# Patient Record
Sex: FEMALE | Race: WHITE | NOT HISPANIC OR LATINO | Employment: UNEMPLOYED | URBAN - NONMETROPOLITAN AREA
[De-identification: names, ages, dates, MRNs, and addresses within clinical notes are randomized per-mention and may not be internally consistent; named-entity substitution may affect disease eponyms.]

---

## 2017-01-19 ENCOUNTER — CONSULT (OUTPATIENT)
Dept: CARDIOLOGY | Facility: CLINIC | Age: 77
End: 2017-01-19

## 2017-01-19 VITALS
HEART RATE: 62 BPM | BODY MASS INDEX: 27.48 KG/M2 | WEIGHT: 140 LBS | HEIGHT: 60 IN | SYSTOLIC BLOOD PRESSURE: 112 MMHG | DIASTOLIC BLOOD PRESSURE: 62 MMHG

## 2017-01-19 DIAGNOSIS — E78.00 HYPERCHOLESTEREMIA: ICD-10-CM

## 2017-01-19 DIAGNOSIS — R07.89 CHEST TIGHTNESS: ICD-10-CM

## 2017-01-19 DIAGNOSIS — R01.1 MURMUR, CARDIAC: ICD-10-CM

## 2017-01-19 DIAGNOSIS — R06.02 SHORTNESS OF BREATH: ICD-10-CM

## 2017-01-19 DIAGNOSIS — I10 ESSENTIAL HYPERTENSION: ICD-10-CM

## 2017-01-19 DIAGNOSIS — I25.10 CORONARY ARTERY DISEASE INVOLVING NATIVE CORONARY ARTERY OF NATIVE HEART WITHOUT ANGINA PECTORIS: Primary | ICD-10-CM

## 2017-01-19 PROCEDURE — 99204 OFFICE O/P NEW MOD 45 MIN: CPT | Performed by: INTERNAL MEDICINE

## 2017-01-19 PROCEDURE — 93000 ELECTROCARDIOGRAM COMPLETE: CPT | Performed by: INTERNAL MEDICINE

## 2017-01-19 RX ORDER — DILTIAZEM HYDROCHLORIDE 180 MG/1
180 CAPSULE, COATED, EXTENDED RELEASE ORAL DAILY
COMMUNITY
End: 2017-12-20 | Stop reason: DRUGHIGH

## 2017-01-19 RX ORDER — PANTOPRAZOLE SODIUM 40 MG/1
40 TABLET, DELAYED RELEASE ORAL DAILY
COMMUNITY

## 2017-01-19 RX ORDER — DICYCLOMINE HCL 20 MG
20 TABLET ORAL NIGHTLY
Qty: 30 TABLET | Refills: 8 | Status: SHIPPED | OUTPATIENT
Start: 2017-01-19 | End: 2017-03-15 | Stop reason: ALTCHOICE

## 2017-01-19 RX ORDER — NITROGLYCERIN 0.4 MG/1
TABLET SUBLINGUAL
Qty: 100 TABLET | Refills: 11 | Status: SHIPPED | OUTPATIENT
Start: 2017-01-19 | End: 2017-12-20 | Stop reason: ALTCHOICE

## 2017-01-19 RX ORDER — EZETIMIBE 10 MG/1
10 TABLET ORAL DAILY
COMMUNITY
End: 2020-09-21 | Stop reason: SDUPTHER

## 2017-01-19 RX ORDER — CLOPIDOGREL BISULFATE 75 MG/1
75 TABLET ORAL DAILY
COMMUNITY
End: 2019-11-20 | Stop reason: ALTCHOICE

## 2017-01-19 RX ORDER — LISINOPRIL 5 MG/1
5 TABLET ORAL DAILY
COMMUNITY
End: 2017-05-17 | Stop reason: SDUPTHER

## 2017-01-19 NOTE — LETTER
January 19, 2017     Buffy Weldon MD  [address] [facility] [address]    Patient: Mago Gupta   YOB: 1940   Date of Visit: 1/19/2017       Dear Dr. Shiraz MD:    Thank you for referring Mago Gupta to me for evaluation. Below are the relevant portions of my assessment and plan of care.       Subjective   Mago Gupta is a 76 y.o. female came in today for her initial cardiac evaluation.  She has history of ischemic heart disease diagnosed in 2014.  She apparently was been having chest pain, and was referred to cardiologist from [place].  She underwent stress test followed by cardiac catheterization and stent placement.  The details of which is not available to me.  She did undergo a stress test after that and it was reported as normal.  She now wants to be followed locally since those cardiologists have stopped coming to [place].  She is under a lot of stress recently, taking care of her daughter who is mentally handicapped and now has a hip fracture.  She has been doing a lot of pulling and tugging and started noticing increasing chest pain.  She takes a baby aspirin at that time and feels little better.  She is not able to take aspirin everyday secondary to gastritis.  Her lab work done recently showed that the LDL is around 99.  She also has been having occasional shortness of breath, no history of palpitation or dizziness.              Cardiac History  Past Surgical History   Procedure Laterality Date   • Cardiovascular stress test  09/15/2014     @[facility]. CVA- 5 Min, 95% THR. Negative.   • Cardiac catheterization  08/18/2014     Stent in [place]       Current Outpatient Prescriptions   Medication Sig Dispense Refill   • Calcium Citrate-Vitamin D (CALCIUM + D PO) Take  by mouth.     • clopidogrel (PLAVIX) 75 MG tablet Take 75 mg by mouth Daily.     • diltiaZEM CD (CARDIZEM CD) 180 MG 24 hr capsule Take 180 mg by mouth Daily.     • ezetimibe (ZETIA) 10 MG tablet Take 10 mg by  mouth Daily.     • lisinopril (PRINIVIL,ZESTRIL) 5 MG tablet Take 5 mg by mouth Daily.     • Multiple Vitamins-Minerals (MULTI COMPLETE PO) Take  by mouth.     • Multiple Vitamins-Minerals (PRESERVISION AREDS PO) Take  by mouth 2 (Two) Times a Day.     • pantoprazole (PROTONIX) 40 MG EC tablet Take 40 mg by mouth Daily.     • dicyclomine (BENTYL) 20 MG tablet Take 1 tablet by mouth Every Night. 30 tablet 8   • nitroglycerin (NITROSTAT) 0.4 MG SL tablet 1 under the tongue as needed for angina, may repeat q5mins for up three doses 100 tablet 11     No current facility-administered medications for this visit.        Allergies:  Celebrex [celecoxib]; Codeine; and Penicillins              Assessment/Plan   Her heart rate and blood pressure appears to be stable.  Her EKG showed LVH, old septal infarct, possible lateral infarct with no acute ST-T changes.  Her clinical examination is unremarkable.  Some of her symptoms could be related to GERD.  She is already on a PPI.  I added Bentyl 20 mg twice a day.  Given her history, need to rule out cardiac cause of the chest pain.  I scheduled her to undergo a stress test to evaluate her functional status, blood pressure response and rule out ischemia.  She also need an echocardiogram to evaluate the LV function and any wall motion abnormalities.  If there is evidence of ischemia, she may need to undergo cardiac catheterization again.  At that time, she may need to be on a statin also and try to keep the LDL less than 70.  Based on the results of the test, further recommendations will be made.   Mago was seen today for establish care, labs, chest pain, stress and cardiac testing.    Diagnoses and all orders for this visit:    Coronary artery disease involving native coronary artery of native heart without angina pectoris  -     Stress Test With Myocardial Perfusion One Day; Future    Essential hypertension    Hypercholesteremia    Chest tightness  -     Stress Test With  Myocardial Perfusion One Day; Future    Shortness of breath  -     Adult Transthoracic Echo Complete; Future    Murmur, cardiac  -     Adult Transthoracic Echo Complete; Future    Other orders  -     dicyclomine (BENTYL) 20 MG tablet; Take 1 tablet by mouth Every Night.  -     nitroglycerin (NITROSTAT) 0.4 MG SL tablet; 1 under the tongue as needed for angina, may repeat q5mins for up three doses                   If you have questions, please do not hesitate to call me. I look forward to following Mago along with you.         Sincerely,        Kris Viera MD        CC: No Recipients

## 2017-01-19 NOTE — MR AVS SNAPSHOT
Mago Gupta   1/19/2017 10:00 AM   Consult    Dept Phone:  [phone]   Encounter #:  [id number]    Provider:  Kris Viera MD   Department:  [facility] CARDIOLOGY                Your Full Care Plan              Today's Medication Changes          These changes are accurate as of: 1/19/17 10:57 AM.  If you have any questions, ask your nurse or doctor.               New Medication(s)Ordered:     dicyclomine 20 MG tablet   Commonly known as:  BENTYL   Take 1 tablet by mouth Every Night.   Started by:  Kris Viera MD       nitroglycerin 0.4 MG SL tablet   Commonly known as:  NITROSTAT   1 under the tongue as needed for angina, may repeat q5mins for up three doses   Started by:  Kris Viera MD            Where to Get Your Medications      These medications were sent to [place] [address] AT [facility]  - [phone] [facility] [phone] [address] [address]     Phone:  [phone]     dicyclomine 20 MG tablet    nitroglycerin 0.4 MG SL tablet                  Your Updated Medication List          This list is accurate as of: 1/19/17 10:57 AM.  Always use your most recent med list.                CALCIUM + D PO       clopidogrel 75 MG tablet   Commonly known as:  PLAVIX       dicyclomine 20 MG tablet   Commonly known as:  BENTYL   Take 1 tablet by mouth Every Night.       diltiaZEM  MG 24 hr capsule   Commonly known as:  CARDIZEM CD       lisinopril 5 MG tablet   Commonly known as:  PRINIVIL,ZESTRIL       * MULTI COMPLETE PO       * PRESERVISION AREDS PO       nitroglycerin 0.4 MG SL tablet   Commonly known as:  NITROSTAT   1 under the tongue as needed for angina, may repeat q5mins for up three doses       pantoprazole 40 MG EC tablet   Commonly known as:  PROTONIX       ZETIA 10 MG tablet   Generic drug:  ezetimibe       * Notice:  This list has 2  medication(s) that are the same as other medications prescribed for you. Read the directions carefully, and ask your doctor or other care provider to review them with you.            You Were Diagnosed With        Codes Comments    Coronary artery disease involving native coronary artery of native heart without angina pectoris    -  Primary ICD-10-CM: I25.10  ICD-9-CM: 414.01     Essential hypertension     ICD-10-CM: I10  ICD-9-CM: 401.9     Hypercholesteremia     ICD-10-CM: E78.00  ICD-9-CM: 272.0     Chest tightness     ICD-10-CM: R07.89  ICD-9-CM: 786.59     Shortness of breath     ICD-10-CM: R06.02  ICD-9-CM: 786.05     Murmur, cardiac     ICD-10-CM: R01.1  ICD-9-CM: 785.2       Instructions     None    Patient Instructions History      Upcoming Appointments     Visit Type Date Time Department    CONSULT 2017 10:00 AM [facility]    FOLLOW UP 2017 10:15 AM [facility]      [employer] Signup     [employer] allows you to send messages to your doctor, view your test results, renew your prescriptions, schedule appointments, and more. To sign up, go to [employer] and click on the Sign Up Now link in the New User? box. Enter your [employer] Activation Code exactly as it appears below along with the last four digits of your Social Security Number and your Date of Birth () to complete the sign-up process. If you do not sign up before the expiration date, you must request a new code.    [employer] Activation Code: [email or web address]  Expires: 2017 10:56 AM    If you have questions, you can email [email or web address] or call [phone] to talk to our [employer] staff. Remember, [employer] is NOT to be used for urgent needs. For medical emergencies, dial 911.               Other Info from Your Visit           Your Appointments     2017 10:15 AM EDT   Follow Up with KIRILL Stover   [facility] (--)    [address]  "[facility] [address] [phone]           Arrive 15 minutes prior to appointment.              Allergies     Celebrex [Celecoxib]      Codeine      Penicillins        Reason for Visit     Establish Care Transferring from cardiologist in [place]. ( [facility], Dr Brenner, 8/18/14, stenting)    Labs brought copy of most recent labs from August.     Chest Pain random chest pain, mild pain since stenting. Takes an ASA 81 mg when she feels the pain, which seems to help.    Stress under a lot of stress, cares for a mentally handicapped daughter that recently had a hip fracture.  Was having to do a lot of pulling and tugging.     Cardiac testing no cardiac testing since stenting in 2014, told her she had more blockages that may need additional stenting.       Vital Signs     Blood Pressure Pulse Height Weight Body Mass Index Smoking Status    112/62 (BP Location: Left arm) 62 60\" (152.4 cm) 140 lb (63.5 kg) 27.34 kg/m2 Never Smoker      Problems and Diagnoses Noted     Coronary artery disease involving native coronary artery of native heart without angina pectoris    -  Primary    High blood pressure        High cholesterol        Chest tightness        Shortness of breath        Murmur, cardiac            "

## 2017-01-19 NOTE — PROGRESS NOTES
Chief Complaint   Patient presents with   • Establish Care     Transferring from cardiologist in [place]. ( [facility], Dr Brenner, 8/18/14, stenting)   • Labs     brought copy of most recent labs from August.    • Chest Pain     random chest pain, mild pain since stenting. Takes an ASA 81 mg when she feels the pain, which seems to help.   • Stress     under a lot of stress, cares for a mentally handicapped daughter that recently had a hip fracture.  Was having to do a lot of pulling and tugging.    • Cardiac testing     no cardiac testing since stenting in 2014, told her she had more blockages that may need additional stenting.        CARDIAC COMPLAINTS  chest pressure/discomfort and dyspnea        Subjective   Mago Gupta is a 76 y.o. female came in today for her initial cardiac evaluation.  She has history of ischemic heart disease diagnosed in 2014.  She apparently was been having chest pain, and was referred to cardiologist from [place].  She underwent stress test followed by cardiac catheterization and stent placement.  The details of which is not available to me.  She did undergo a stress test after that and it was reported as normal.  She now wants to be followed locally since those cardiologists have stopped coming to [place].  She is under a lot of stress recently, taking care of her daughter who is mentally handicapped and now has a hip fracture.  She has been doing a lot of pulling and tugging and started noticing increasing chest pain.  She takes a baby aspirin at that time and feels little better.  She is not able to take aspirin everyday secondary to gastritis.  Her lab work done recently showed that the LDL is around 99.  She also has been having occasional shortness of breath, no history of palpitation or dizziness.              Cardiac History  Past Surgical History   Procedure Laterality Date   • Cardiovascular stress test  09/15/2014      @[facility]. CVA- 5 Min, 95% THR. Negative.   • Cardiac catheterization  08/18/2014     Stent in [place]       Current Outpatient Prescriptions   Medication Sig Dispense Refill   • Calcium Citrate-Vitamin D (CALCIUM + D PO) Take  by mouth.     • clopidogrel (PLAVIX) 75 MG tablet Take 75 mg by mouth Daily.     • diltiaZEM CD (CARDIZEM CD) 180 MG 24 hr capsule Take 180 mg by mouth Daily.     • ezetimibe (ZETIA) 10 MG tablet Take 10 mg by mouth Daily.     • lisinopril (PRINIVIL,ZESTRIL) 5 MG tablet Take 5 mg by mouth Daily.     • Multiple Vitamins-Minerals (MULTI COMPLETE PO) Take  by mouth.     • Multiple Vitamins-Minerals (PRESERVISION AREDS PO) Take  by mouth 2 (Two) Times a Day.     • pantoprazole (PROTONIX) 40 MG EC tablet Take 40 mg by mouth Daily.     • dicyclomine (BENTYL) 20 MG tablet Take 1 tablet by mouth Every Night. 30 tablet 8   • nitroglycerin (NITROSTAT) 0.4 MG SL tablet 1 under the tongue as needed for angina, may repeat q5mins for up three doses 100 tablet 11     No current facility-administered medications for this visit.        Allergies:  Celebrex [celecoxib]; Codeine; and Penicillins      Past Medical History   Diagnosis Date   • Cataract      Removed bilaterally   • Coronary artery disease      s/p stenting in 2014   • GERD (gastroesophageal reflux disease)    • H/O total hysterectomy    • History of cholecystectomy    • History of eye surgery      multiple eye surgeries   • Hx of breast reduction, elective    • Hyperlipidemia    • Hypertension    • Rheumatic fever      When she was 11       Social History     Social History   • Marital status:      Spouse name: N/A   • Number of children: N/A   • Years of education: N/A     Occupational History   • Not on file.     Social History Main Topics   • Smoking status: Never Smoker   • Smokeless tobacco: Never Used   • Alcohol use No   • Drug use: No   • Sexual activity: Not on file     Other Topics Concern   • Not on file     Social History  "Narrative   • No narrative on file       Family History   Problem Relation Age of Onset   • Heart attack Mother    • Diabetes Mother    • Cancer Mother    • Heart attack Father    • Heart attack Brother      CABG   • Diabetes Brother    • Heart attack Brother      CABG   • Heart attack Brother      CABG   • Heart attack Brother      CABG   • Heart disease Sister        Review of Systems   Constitutional: Positive for fatigue. Negative for activity change.   HENT: Negative for congestion and ear discharge.    Eyes: Negative for discharge.   Respiratory: Positive for chest tightness and shortness of breath.    Cardiovascular: Positive for chest pain.   Gastrointestinal: Negative for abdominal distention.   Endocrine: Negative for cold intolerance.   Genitourinary: Negative for difficulty urinating.   Musculoskeletal: Positive for arthralgias and myalgias.   Allergic/Immunologic: Negative for environmental allergies.   Neurological: Negative for dizziness.   Hematological: Negative for adenopathy.   Psychiatric/Behavioral: Negative for agitation.       Diabetes- No  Thyroid- normal    Objective     Visit Vitals   • /62 (BP Location: Left arm)   • Pulse 62   • Ht 60\" (152.4 cm)   • Wt 140 lb (63.5 kg)   • BMI 27.34 kg/m2       Physical Exam   Constitutional: She appears well-developed.   HENT:   Head: Normocephalic.   Eyes: Pupils are equal, round, and reactive to light.   Neck: Normal range of motion.   Cardiovascular: Normal rate.    Murmur heard.  Pulmonary/Chest: Effort normal.   Abdominal: Soft.   Musculoskeletal: Normal range of motion.   Neurological: She is alert.   Skin: Skin is warm.   Psychiatric: She has a normal mood and affect.         ECG 12 Lead  Date/Time: 1/19/2017 12:19 PM  Performed by: OTONIEL JENNINGS  Authorized by: OTONIEL JENNINGS   Previous ECG: no previous ECG available  Rhythm: sinus rhythm  Rate: normal  QRS axis: normal  Other findings: LVH  Q waves: V1, V2, III and " aVF  Clinical impression: abnormal ECG                  Assessment/Plan   Her heart rate and blood pressure appears to be stable.  Her EKG showed LVH, old septal infarct, possible lateral infarct with no acute ST-T changes.  Her clinical examination is unremarkable.  Some of her symptoms could be related to GERD.  She is already on a PPI.  I added Bentyl 20 mg twice a day.  Given her history, need to rule out cardiac cause of the chest pain.  I scheduled her to undergo a stress test to evaluate her functional status, blood pressure response and rule out ischemia.  She also need an echocardiogram to evaluate the LV function and any wall motion abnormalities.  If there is evidence of ischemia, she may need to undergo cardiac catheterization again.  At that time, she may need to be on a statin also and try to keep the LDL less than 70.  Based on the results of the test, further recommendations will be made.   Mago was seen today for establish care, labs, chest pain, stress and cardiac testing.    Diagnoses and all orders for this visit:    Coronary artery disease involving native coronary artery of native heart without angina pectoris  -     Stress Test With Myocardial Perfusion One Day; Future    Essential hypertension    Hypercholesteremia    Chest tightness  -     Stress Test With Myocardial Perfusion One Day; Future    Shortness of breath  -     Adult Transthoracic Echo Complete; Future    Murmur, cardiac  -     Adult Transthoracic Echo Complete; Future    Other orders  -     dicyclomine (BENTYL) 20 MG tablet; Take 1 tablet by mouth Every Night.  -     nitroglycerin (NITROSTAT) 0.4 MG SL tablet; 1 under the tongue as needed for angina, may repeat q5mins for up three doses                         Electronically signed by Kris Viera MD January 19, 2017 12:14 PM

## 2017-01-24 ENCOUNTER — OUTSIDE FACILITY SERVICE (OUTPATIENT)
Dept: CARDIOLOGY | Facility: CLINIC | Age: 77
End: 2017-01-24

## 2017-01-24 ENCOUNTER — HOSPITAL ENCOUNTER (OUTPATIENT)
Dept: CARDIOLOGY | Facility: HOSPITAL | Age: 77
Discharge: HOME | End: 2017-01-24
Attending: INTERNAL MEDICINE

## 2017-01-24 LAB
MAXIMAL PREDICTED HEART RATE: 143 BPM
STRESS TARGET HR: 122 BPM

## 2017-01-24 PROCEDURE — 93306 TTE W/DOPPLER COMPLETE: CPT | Performed by: INTERNAL MEDICINE

## 2017-01-24 PROCEDURE — 0 TECHNETIUM SESTAMIBI: Performed by: INTERNAL MEDICINE

## 2017-01-24 PROCEDURE — 93306 TTE W/DOPPLER COMPLETE: CPT

## 2017-01-24 PROCEDURE — 93017 CV STRESS TEST TRACING ONLY: CPT

## 2017-01-24 PROCEDURE — A9500 TC99M SESTAMIBI: HCPCS | Performed by: INTERNAL MEDICINE

## 2017-01-24 PROCEDURE — 93018 CV STRESS TEST I&R ONLY: CPT | Performed by: INTERNAL MEDICINE

## 2017-01-24 PROCEDURE — 78452 HT MUSCLE IMAGE SPECT MULT: CPT | Performed by: INTERNAL MEDICINE

## 2017-01-24 RX ADMIN — Medication 1 DOSE: at 12:00

## 2017-01-25 ENCOUNTER — TELEPHONE (OUTPATIENT)
Dept: CARDIOLOGY | Facility: CLINIC | Age: 77
End: 2017-01-25

## 2017-01-25 RX ORDER — ISOSORBIDE MONONITRATE 30 MG/1
30 TABLET, EXTENDED RELEASE ORAL DAILY
Qty: 90 TABLET | Refills: 1 | Status: SHIPPED | OUTPATIENT
Start: 2017-01-25 | End: 2017-03-15 | Stop reason: ALTCHOICE

## 2017-01-25 NOTE — TELEPHONE ENCOUNTER
Patient aware of stress test and echo results and recommendations.  Add Imdur 30 mg daily and patient aware to call if chest pain continues.

## 2017-01-30 ENCOUNTER — TELEPHONE (OUTPATIENT)
Dept: CARDIOLOGY | Facility: CLINIC | Age: 77
End: 2017-01-30

## 2017-01-30 NOTE — TELEPHONE ENCOUNTER
Called patient with recommendations of ranexa 500 mg bid and if continues to have chest pain,cath, states that she still has chest pain at times and does not wish to take ranexa and that she rather to just go ahead and have cath done if that is okay with you?

## 2017-01-30 NOTE — TELEPHONE ENCOUNTER
Received call from patient who reports had started imdur 30mg daily and has taken one dose due to side effects, is having severe headaches, dizziness, nausea, wants to know if it is necessary and if so is there something else she can take?  What are your recommendations?  Thank you.

## 2017-01-31 ENCOUNTER — TELEPHONE (OUTPATIENT)
Dept: CARDIOLOGY | Facility: CLINIC | Age: 77
End: 2017-01-31

## 2017-01-31 NOTE — TELEPHONE ENCOUNTER
Pt called, she has decided to proceed with cath. Concerned due to not being able to take the Imdur, had nausea, severe head ache, blurred vision. Asking if it could be changed to something else.

## 2017-01-31 NOTE — TELEPHONE ENCOUNTER
Patient is scheduled for cardiac catheterization on 2/7/17.  She did not want to try Ranexa per previous telephone encounter, she prefers to have cath for a definite diagnosis.

## 2017-02-03 ENCOUNTER — TELEPHONE (OUTPATIENT)
Dept: CARDIOLOGY | Facility: CLINIC | Age: 77
End: 2017-02-03

## 2017-02-03 NOTE — TELEPHONE ENCOUNTER
Received call from patient who states 'I am going into hospital Tuesday to have my heart checked out and i want to see if they could check my stomach while im in there, i cant eat 1/2 cup of food and i am loosing weight' discussed with patient that she is scheduled for heart cath Tuesday and that she may want to contact her PCP regarding weight loss and decreased appetite due to patient reports does not see gastroenterologist. Patient verbalizes understanding of instructions.

## 2017-02-07 ENCOUNTER — OUTSIDE FACILITY SERVICE (OUTPATIENT)
Dept: CARDIOLOGY | Facility: CLINIC | Age: 77
End: 2017-02-07

## 2017-02-07 PROCEDURE — 93458 L HRT ARTERY/VENTRICLE ANGIO: CPT | Performed by: INTERNAL MEDICINE

## 2017-02-10 ENCOUNTER — DOCUMENTATION (OUTPATIENT)
Dept: CARDIOLOGY | Facility: CLINIC | Age: 77
End: 2017-02-10

## 2017-02-10 NOTE — PROGRESS NOTES
Received third party rejection from [facility] pharmacy on L-Arginine 500mg, attempted to prior auth via cover my meds with response does not require prior authorization. Key [id number].

## 2017-02-21 ENCOUNTER — TELEPHONE (OUTPATIENT)
Dept: CARDIOLOGY | Facility: CLINIC | Age: 77
End: 2017-02-21

## 2017-02-21 RX ORDER — SOY ISOFLAVONE 40 MG
TABLET ORAL
Qty: 30 EACH | Refills: 5 | Status: SHIPPED | OUTPATIENT
Start: 2017-02-21 | End: 2017-03-15 | Stop reason: SDUPTHER

## 2017-02-21 NOTE — TELEPHONE ENCOUNTER
Patient had heart cath done on 02/07/17. Upon discharge you prescribed L-Arginine 500 mg 2 caps BID. However, the script was for a quantity of  60 only with 5 refills. Is it ok to re-order for #120 per 30 days?

## 2017-03-15 ENCOUNTER — OFFICE VISIT (OUTPATIENT)
Dept: CARDIOLOGY | Facility: CLINIC | Age: 77
End: 2017-03-15

## 2017-03-15 VITALS
DIASTOLIC BLOOD PRESSURE: 62 MMHG | HEIGHT: 61 IN | BODY MASS INDEX: 26.24 KG/M2 | HEART RATE: 68 BPM | WEIGHT: 139 LBS | SYSTOLIC BLOOD PRESSURE: 106 MMHG

## 2017-03-15 DIAGNOSIS — R07.89 OTHER CHEST PAIN: ICD-10-CM

## 2017-03-15 DIAGNOSIS — I25.10 CORONARY ARTERY DISEASE INVOLVING NATIVE CORONARY ARTERY OF NATIVE HEART WITHOUT ANGINA PECTORIS: Primary | ICD-10-CM

## 2017-03-15 DIAGNOSIS — E78.49 OTHER HYPERLIPIDEMIA: ICD-10-CM

## 2017-03-15 DIAGNOSIS — I10 ESSENTIAL HYPERTENSION: ICD-10-CM

## 2017-03-15 PROBLEM — K21.9 GERD (GASTROESOPHAGEAL REFLUX DISEASE): Status: ACTIVE | Noted: 2017-03-15

## 2017-03-15 PROBLEM — E78.5 HYPERLIPEMIA: Status: ACTIVE | Noted: 2017-03-15

## 2017-03-15 PROCEDURE — 99213 OFFICE O/P EST LOW 20 MIN: CPT | Performed by: NURSE PRACTITIONER

## 2017-03-15 RX ORDER — SOY ISOFLAVONE 40 MG
TABLET ORAL
Qty: 120 EACH | Refills: 8 | Status: SHIPPED | OUTPATIENT
Start: 2017-03-15 | End: 2018-03-28 | Stop reason: DRUGHIGH

## 2017-03-15 NOTE — PROGRESS NOTES
Chief Complaint   Patient presents with   • Hospital follow up     Doing well since heart cath. Last labs in 08/2016. She has questions concerning L-Arginine? She states having some pain left shoulder and between shoulders.   • Med Refill     Needs refills on L Arginine-90 day.       Cardiac Complaints  chest pressure/discomfort      Subjective   Mago Gupta is a 76 y.o. female history of ischemic heart disease diagnosed in 2014.  She apparently had been having chest pain, and was referred [place]. She underwent stress test followed by cardiac catheterization and stent placement.  Details not available to me.  She was referred to us after her cardiologists moved. She reported a lot of stress at consult and had been having some chest pain with pulling and tugging on her handicapped daughter.  Cardiac workup was advised at consult with stress and echo. Stress showed good LV function and apical ischemia.  Cath was later advised that showed patent LAD and small vessel disease, medical management advised.  She was started on L.arginine in the hospital after cath.  She comes today still reporting some pain in her left shoulder and in her back, she feels like it is from mostly tugging on her daughter.  States the pain has improved since cath and medication changes. Refills of L.arginine requested.      Cardiac History  Past Surgical History   Procedure Laterality Date   • Cardiovascular stress test  09/15/2014     @[facility]. CVA- 5 Min, 95% THR. Negative.   • Cardiac catheterization  08/18/2014     Stent in [place] (LAD)   • Echo - converted  01/24/2017     EF > 65%   • Cardiovascular stress test  01/24/2017     6 Min, 85% THR. Apical ischemia.   • Cardiac catheterization  02/07/2017     Patent stent in LAD. Small Distal LAD and PDA       Current Outpatient Prescriptions   Medication Sig Dispense Refill   • Calcium Citrate-Vitamin D (CALCIUM + D PO) Take  by mouth Daily.     • clopidogrel (PLAVIX) 75 MG tablet Take 75 mg  by mouth Daily.     • diltiaZEM CD (CARDIZEM CD) 180 MG 24 hr capsule Take 180 mg by mouth Daily.     • ezetimibe (ZETIA) 10 MG tablet Take 10 mg by mouth Daily.     • L-Arginine 500 MG capsule Take 2 capsules , by mouth, twice a day, dispense 120 per 30 days with 5 refills 120 each 8   • lisinopril (PRINIVIL,ZESTRIL) 5 MG tablet Take 5 mg by mouth Daily.     • Multiple Vitamins-Minerals (MULTI COMPLETE PO) Take  by mouth.     • Multiple Vitamins-Minerals (PRESERVISION AREDS PO) Take  by mouth 2 (Two) Times a Day.     • nitroglycerin (NITROSTAT) 0.4 MG SL tablet 1 under the tongue as needed for angina, may repeat q5mins for up three doses 100 tablet 11   • pantoprazole (PROTONIX) 40 MG EC tablet Take 40 mg by mouth Daily.       No current facility-administered medications for this visit.        Celebrex [celecoxib]; Codeine; and Penicillins    Past Medical History   Diagnosis Date   • Cataract      Removed bilaterally   • Coronary artery disease      s/p stenting in 2014   • GERD (gastroesophageal reflux disease)    • H/O total hysterectomy    • History of cholecystectomy    • History of eye surgery      multiple eye surgeries   • Hx of breast reduction, elective    • Hyperlipidemia    • Hypertension    • Rheumatic fever      When she was 11   • Valvular disease        Social History     Social History   • Marital status:      Spouse name: N/A   • Number of children: N/A   • Years of education: N/A     Occupational History   • Not on file.     Social History Main Topics   • Smoking status: Never Smoker   • Smokeless tobacco: Never Used   • Alcohol use No   • Drug use: No   • Sexual activity: Not on file     Other Topics Concern   • Not on file     Social History Narrative       Family History   Problem Relation Age of Onset   • Heart attack Mother    • Diabetes Mother    • Cancer Mother    • Heart attack Father    • Heart attack Brother      CABG   • Diabetes Brother    • Heart attack Brother      CABG   •  "Heart attack Brother      CABG   • Heart attack Brother      CABG   • Heart disease Sister    • Diabetes Daughter    • Hypertension Daughter    • Hyperlipidemia Daughter    • Hypertension Daughter        Review of Systems   Constitutional: Negative.    HENT: Negative.    Respiratory: Negative.    Cardiovascular: Positive for chest pain.   Gastrointestinal: Negative.    Genitourinary: Negative.    Musculoskeletal: Negative.    Skin: Negative.    Neurological: Negative.    Psychiatric/Behavioral: Negative.        DiabetesNo  Thyroidnormal    Objective     Visit Vitals   • /62   • Pulse 68   • Ht 61\" (154.9 cm)   • Wt 139 lb (63 kg)   • BMI 26.26 kg/m2       Physical Exam   Constitutional: She is oriented to person, place, and time. She appears well-developed and well-nourished.   HENT:   Head: Normocephalic and atraumatic.   Eyes: EOM are normal. Pupils are equal, round, and reactive to light.   Neck: Normal range of motion. Neck supple.   Cardiovascular: Normal rate and regular rhythm.    Murmur heard.  Pulmonary/Chest: Effort normal and breath sounds normal.   Abdominal: Soft.   Musculoskeletal: Normal range of motion.   Neurological: She is alert and oriented to person, place, and time.   Skin: Skin is warm and dry.   Psychiatric: She has a normal mood and affect.       Procedures    Assessment/Plan     HR and BP are both stable today.  No changes in meds will be advised.  We will continue on current dosing L.arginine.  If chest pain should worsen, patient advised to call and ranexa will be considered for adjunct therapy for small vessel disease.  She is currently walking without concern and walks without cardiac symptoms.  Labs and most refills with you.  Good cardiac diet and activity as tolerated encouraged.  6 month follow up advised or sooner if needed.      Problems Addressed this Visit        Cardiovascular and Mediastinum    Coronary artery disease involving native coronary artery of native heart " without angina pectoris - Primary      Other Visit Diagnoses     Essential hypertension        Other hyperlipidemia        Other chest pain                  Electronically signed by KIRILL Brooks March 15, 2017 5:01 PM

## 2017-05-17 RX ORDER — LISINOPRIL 5 MG/1
5 TABLET ORAL DAILY
Qty: 30 TABLET | Refills: 5 | Status: SHIPPED | OUTPATIENT
Start: 2017-05-17 | End: 2017-11-14 | Stop reason: SDUPTHER

## 2017-06-07 ENCOUNTER — TELEPHONE (OUTPATIENT)
Dept: CARDIOLOGY | Facility: CLINIC | Age: 77
End: 2017-06-07

## 2017-06-07 NOTE — TELEPHONE ENCOUNTER
Patient called in reporting that has been having stomach issues and was seen by PCP yesterday and was told that it could possibly be related Wilbert- Arginine, patient states takes L-Arginine 500mg 4 times a day. What are your recommendations? Thank you.

## 2017-09-27 ENCOUNTER — OFFICE VISIT (OUTPATIENT)
Dept: CARDIOLOGY | Facility: CLINIC | Age: 77
End: 2017-09-27

## 2017-09-27 VITALS
HEART RATE: 80 BPM | DIASTOLIC BLOOD PRESSURE: 80 MMHG | WEIGHT: 139 LBS | BODY MASS INDEX: 26.24 KG/M2 | HEIGHT: 61 IN | SYSTOLIC BLOOD PRESSURE: 110 MMHG

## 2017-09-27 DIAGNOSIS — K21.9 GASTROESOPHAGEAL REFLUX DISEASE WITHOUT ESOPHAGITIS: ICD-10-CM

## 2017-09-27 DIAGNOSIS — I10 ESSENTIAL HYPERTENSION: ICD-10-CM

## 2017-09-27 DIAGNOSIS — E78.49 OTHER HYPERLIPIDEMIA: ICD-10-CM

## 2017-09-27 DIAGNOSIS — I25.10 CORONARY ARTERY DISEASE INVOLVING NATIVE CORONARY ARTERY OF NATIVE HEART WITHOUT ANGINA PECTORIS: Primary | ICD-10-CM

## 2017-09-27 PROCEDURE — 99213 OFFICE O/P EST LOW 20 MIN: CPT | Performed by: NURSE PRACTITIONER

## 2017-09-27 NOTE — PROGRESS NOTES
"Chief Complaint   Patient presents with   • Follow-up     For cardiac management. She has most recent labs. She reports will be needing bladder surgery.  She has questions about  over the counter arthritis cream she is using.   • Chest Pain     She states has occasional dull pain to left chest, she states \"nothing to be concerned about\".       Cardiac Complaints  chest pressure/discomfort      Subjective   Mago Gupta is a 77 y.o. female with HTN, hyperlipidemia, and ischemic heart disease diagnosed in 2014 when she had a stent placed to LAD. She was referred to us after her cardiologists moved. She reported a lot of stress at consult and had been having some chest pain with pulling and tugging on her handicapped daughter.  Cardiac workup was advised at consult with stress and echo. Stress showed good LV function and apical ischemia. Cath was later advised that showed patent LAD and small vessel disease, medical management advised. She was started on L.arginine in the hospital after cath. She returns today for follow up and denies any new concerns.  She does continue to have issues with dull ache to her chest but attributes to tugging on her handicapped daughter, this has been the same for sometime.  She denies any shortness of breath or palpitations.  She is using Australian dream cream for arthritis management and has done well with that.  She is walking a mile a day without problems.  Labs were done in August with PCP and most recent show no significant abnormalities.  Renal function was reported as normal with creatinine of 0.7, LDL of 88, and TSH of 1.45. No refills are currently needed.      Cardiac History  Past Surgical History:   Procedure Laterality Date   • CARDIAC CATHETERIZATION  08/18/2014    Stent in [place] (LAD)   • CARDIAC CATHETERIZATION  02/07/2017    Patent stent in LAD. Small Distal LAD and PDA   • CARDIOVASCULAR STRESS TEST  09/15/2014    @[facility]. CVA- 5 Min, 95% THR. Negative.   • " CARDIOVASCULAR STRESS TEST  01/24/2017    6 Min, 85% THR. Apical ischemia.   • ECHO - CONVERTED  01/24/2017    EF > 65%       Current Outpatient Prescriptions   Medication Sig Dispense Refill   • Calcium Citrate-Vitamin D (CALCIUM + D PO) Take  by mouth Daily.     • clopidogrel (PLAVIX) 75 MG tablet Take 75 mg by mouth Daily.     • diltiaZEM CD (CARDIZEM CD) 180 MG 24 hr capsule Take 180 mg by mouth Daily.     • ezetimibe (ZETIA) 10 MG tablet Take 10 mg by mouth Daily.     • L-Arginine 500 MG capsule Take 2 capsules , by mouth, twice a day, dispense 120 per 30 days with 5 refills (Patient taking differently: Take 500 mg by mouth 2 (Two) Times a Day.) 120 each 8   • lisinopril (PRINIVIL,ZESTRIL) 5 MG tablet Take 1 tablet by mouth Daily. 30 tablet 5   • Multiple Vitamins-Minerals (MULTI COMPLETE PO) Take  by mouth.     • Multiple Vitamins-Minerals (PRESERVISION AREDS PO) Take  by mouth 2 (Two) Times a Day.     • nitroglycerin (NITROSTAT) 0.4 MG SL tablet 1 under the tongue as needed for angina, may repeat q5mins for up three doses 100 tablet 11   • pantoprazole (PROTONIX) 40 MG EC tablet Take 40 mg by mouth Daily.       No current facility-administered medications for this visit.        Celebrex [celecoxib]; Codeine; and Penicillins    Past Medical History:   Diagnosis Date   • Cataract     Removed bilaterally   • Coronary artery disease     s/p stenting in 2014   • GERD (gastroesophageal reflux disease)    • H/O total hysterectomy    • History of cholecystectomy    • History of eye surgery     multiple eye surgeries   • Hx of breast reduction, elective    • Hyperlipidemia    • Hypertension    • Rheumatic fever     When she was 11   • Valvular disease        Social History     Social History   • Marital status:      Spouse name: N/A   • Number of children: N/A   • Years of education: N/A     Occupational History   • Not on file.     Social History Main Topics   • Smoking status: Never Smoker   • Smokeless  "tobacco: Never Used   • Alcohol use No   • Drug use: No   • Sexual activity: Not on file     Other Topics Concern   • Not on file     Social History Narrative       Family History   Problem Relation Age of Onset   • Heart attack Mother    • Diabetes Mother    • Cancer Mother    • Heart attack Father    • Heart attack Brother      CABG   • Diabetes Brother    • Heart attack Brother      CABG   • Heart attack Brother      CABG   • Heart attack Brother      CABG   • Heart disease Sister    • Diabetes Daughter    • Hypertension Daughter    • Hyperlipidemia Daughter    • Hypertension Daughter        Review of Systems   Constitution: Negative for weakness and malaise/fatigue.   Cardiovascular: Positive for chest pain. Negative for dyspnea on exertion, near-syncope and syncope.   Respiratory: Negative for shortness of breath and wheezing.    Musculoskeletal: Negative for arthritis and back pain.   Gastrointestinal: Negative for anorexia, heartburn and nausea.   Genitourinary: Negative for dysuria, hematuria, hesitancy and nocturia.   Neurological: Negative for dizziness, light-headedness, loss of balance and numbness.   Psychiatric/Behavioral: Negative for altered mental status and depression.       DiabetesNo  Thyroidnormal    Objective     /80  Pulse 80  Ht 61\" (154.9 cm)  Wt 139 lb (63 kg)  BMI 26.26 kg/m2    Physical Exam   Constitutional: She is oriented to person, place, and time. She appears well-developed and well-nourished.   HENT:   Head: Normocephalic and atraumatic.   Eyes: EOM are normal. Pupils are equal, round, and reactive to light.   Neck: Normal range of motion. Neck supple.   Cardiovascular: Normal rate and regular rhythm.    Murmur heard.  Pulmonary/Chest: Effort normal and breath sounds normal.   Abdominal: Soft.   Musculoskeletal: Normal range of motion.   Neurological: She is alert and oriented to person, place, and time.   Skin: Skin is warm and dry.   Psychiatric: She has a normal mood " and affect. Her behavior is normal.       Procedures    Assessment/Plan     HR and BP are both stable today.  No changes in meds will be made.  No new cardiac testing will be advised today as no new concerns are voiced and she states she is busy at home and often walks about 1 mile daily without concern.  Labs are done with your office, she brought recent copy from August and no significant abnormalities seen.   No refills are needed at present.  Good cardiac diet and activity as tolerated advised.  6 month follow up advised or sooner if needed.      Problems Addressed this Visit        Cardiovascular and Mediastinum    Coronary artery disease involving native coronary artery of native heart without angina pectoris - Primary      Other Visit Diagnoses     Essential hypertension        Other hyperlipidemia        Gastroesophageal reflux disease without esophagitis                  Electronically signed by KIRILL Brooks September 27, 2017 3:59 PM

## 2017-11-14 RX ORDER — LISINOPRIL 5 MG/1
TABLET ORAL
Qty: 30 TABLET | Refills: 11 | Status: SHIPPED | OUTPATIENT
Start: 2017-11-14 | End: 2018-09-21 | Stop reason: SDUPTHER

## 2017-12-20 ENCOUNTER — OFFICE VISIT (OUTPATIENT)
Dept: CARDIOLOGY | Facility: CLINIC | Age: 77
End: 2017-12-20

## 2017-12-20 VITALS
HEIGHT: 61 IN | DIASTOLIC BLOOD PRESSURE: 52 MMHG | WEIGHT: 142 LBS | BODY MASS INDEX: 26.81 KG/M2 | SYSTOLIC BLOOD PRESSURE: 106 MMHG | HEART RATE: 71 BPM

## 2017-12-20 DIAGNOSIS — I20.8 STABLE ANGINA PECTORIS (HCC): Primary | ICD-10-CM

## 2017-12-20 DIAGNOSIS — R53.83 OTHER FATIGUE: ICD-10-CM

## 2017-12-20 DIAGNOSIS — I25.118 CORONARY ARTERY DISEASE OF NATIVE ARTERY OF NATIVE HEART WITH STABLE ANGINA PECTORIS (HCC): ICD-10-CM

## 2017-12-20 DIAGNOSIS — I10 ESSENTIAL HYPERTENSION: ICD-10-CM

## 2017-12-20 DIAGNOSIS — E78.49 OTHER HYPERLIPIDEMIA: ICD-10-CM

## 2017-12-20 PROCEDURE — 99214 OFFICE O/P EST MOD 30 MIN: CPT | Performed by: NURSE PRACTITIONER

## 2017-12-20 RX ORDER — NITROGLYCERIN 400 UG/1
1 SPRAY ORAL
COMMUNITY
End: 2017-12-20 | Stop reason: SDUPTHER

## 2017-12-20 RX ORDER — DILTIAZEM HYDROCHLORIDE 240 MG/1
240 CAPSULE, COATED, EXTENDED RELEASE ORAL DAILY
COMMUNITY
End: 2020-09-21 | Stop reason: SDUPTHER

## 2017-12-20 RX ORDER — NITROGLYCERIN 400 UG/1
1 SPRAY ORAL
Qty: 1 EACH | Refills: 3 | Status: SHIPPED | OUTPATIENT
Start: 2017-12-20 | End: 2020-02-05 | Stop reason: SDUPTHER

## 2017-12-20 NOTE — PROGRESS NOTES
"Chief Complaint   Patient presents with   • Follow-up     for cardiac management   • Chest Pain     episode last week while washing dishes, chest pain, SOB, left chest, resolved after taking a low dose aspirin.    • EGD     Pt thought she was scheduled for an EGD next week, after talking to Dr Weldon' office, she is scheduled for an UGI next week.    • Medication change     pt said PCP increased her diltiazem to 240mg, didn't know why.  Per Dr Weldon office, diltiazem was increased in May. Pharmacy had been pulling from an old script.    • Numbness     numbness/tingling in her legs and feet. Sleeping with her legs elevated at night.    • Med Refill     asking for refill on her Nitro spray       Subjective       Mago Gupta is a 77 y.o. female with a history of hypertension, hyperlipidemia, and ischemic heart disease diagnosed in 2014 when she underwent stenting of the LAD in [place].  The details are not available.  She was referred here to have cardiology locally and underwent work up including stress and echo which showed good LV function and apical ischemia.  Cath was later advised that showed patent LAD and small vessel disease, medical management advised.  She was started on L.arginine in the hospital after cath.  She came in today to be evaluated for an episode of significant chest pain which occurred one week ago while she was in her kitchen washing dishes.  The pain came on suddenly, it was sharp in nature, under the left breast.  The pain was \"intense\" about \"5\" on 1-10 scale.  She took an aspirin 81 mg and the pain eased off very quickly.  The whole episode lasted less than five minutes.  She always has a chronic, dull pain, but this was different and worried her.  She was evaluated by her primary care physician who ordered an upper GI and also requested we evaluate her.  Labs checked in August showed cholesterol well controlled at 174, LDL 88, HDL 62, and Tri 123.  BUN/Cr 13/0.70, glucose 102, and TSH " 1.45.  She is under a lot of stress caring for her handicapped child and aging .      HPI         Cardiac History:    Past Surgical History:   Procedure Laterality Date   • CARDIAC CATHETERIZATION  08/18/2014    Stent in [place] (LAD)   • CARDIAC CATHETERIZATION  02/07/2017    Patent stent in LAD. Small Distal LAD and PDA   • CARDIOVASCULAR STRESS TEST  09/15/2014    @[facility]. CVA- 5 Min, 95% THR. Negative.   • CARDIOVASCULAR STRESS TEST  01/24/2017    6 Min, 85% THR. Apical ischemia.   • ECHO - CONVERTED  01/24/2017    EF > 65%       Current Outpatient Prescriptions   Medication Sig Dispense Refill   • Calcium Citrate-Vitamin D (CALCIUM + D PO) Take  by mouth Daily.     • clopidogrel (PLAVIX) 75 MG tablet Take 75 mg by mouth Daily.     • diltiaZEM CD (CARDIZEM CD) 240 MG 24 hr capsule Take 240 mg by mouth Daily.     • ezetimibe (ZETIA) 10 MG tablet Take 10 mg by mouth Daily.     • L-Arginine 500 MG capsule Take 2 capsules , by mouth, twice a day, dispense 120 per 30 days with 5 refills (Patient taking differently: 1 tab twice a day) 120 each 8   • lisinopril (PRINIVIL,ZESTRIL) 5 MG tablet TAKE ONE TABLET BY MOUTH DAILY 30 tablet 11   • Multiple Vitamins-Minerals (MULTI COMPLETE PO) Take  by mouth.     • Multiple Vitamins-Minerals (PRESERVISION AREDS PO) Take  by mouth 2 (Two) Times a Day.     • nitroglycerin (NITROLINGUAL) 0.4 MG/SPRAY spray Place 1 spray under the tongue Every 5 (Five) Minutes As Needed for Chest Pain. 1 each 3   • pantoprazole (PROTONIX) 40 MG EC tablet Take 40 mg by mouth 2 (Two) Times a Day.       No current facility-administered medications for this visit.        Celebrex [celecoxib]; Codeine; Penicillins; and Dexilant [dexlansoprazole]    Past Medical History:   Diagnosis Date   • Cataract     Removed bilaterally   • Coronary artery disease     s/p stenting in 2014   • GERD (gastroesophageal reflux disease)    • H/O total hysterectomy    • History of cholecystectomy    • History of eye  "surgery     multiple eye surgeries   • Hx of breast reduction, elective    • Hyperlipidemia    • Hypertension    • Rheumatic fever     When she was 11   • Valvular disease        Social History     Social History   • Marital status:      Spouse name: N/A   • Number of children: N/A   • Years of education: N/A     Occupational History   • Not on file.     Social History Main Topics   • Smoking status: Never Smoker   • Smokeless tobacco: Never Used   • Alcohol use No   • Drug use: No   • Sexual activity: Not on file     Other Topics Concern   • Not on file     Social History Narrative       Family History   Problem Relation Age of Onset   • Heart attack Mother    • Diabetes Mother    • Cancer Mother    • Heart attack Father    • Heart attack Brother      CABG   • Diabetes Brother    • Heart attack Brother      CABG   • Heart attack Brother      CABG   • Heart attack Brother      CABG   • Heart disease Sister    • Diabetes Daughter    • Hypertension Daughter    • Hyperlipidemia Daughter    • Hypertension Daughter        Review of Systems   Constitution: Positive for weakness. Negative for decreased appetite, weight gain and weight loss.   HENT: Negative.    Eyes: Negative.    Cardiovascular: Positive for chest pain. Negative for leg swelling, orthopnea and syncope.   Respiratory: Positive for shortness of breath (associated with chest pain ). Negative for cough.    Endocrine: Negative.    Hematologic/Lymphatic: Negative for bleeding problem. Does not bruise/bleed easily.   Musculoskeletal: Negative for arthritis.   Gastrointestinal: Positive for heartburn. Negative for abdominal pain, melena and nausea.   Genitourinary: Negative for dysuria and hematuria.   Neurological: Positive for numbness (legs and feet ). Negative for dizziness.   Psychiatric/Behavioral: Negative for altered mental status and depression.      Diabetes- No  Thyroid-normal    Objective     /52  Pulse 71  Ht 154.9 cm (61\")  Wt 64.4 kg " (142 lb)  BMI 26.83 kg/m2    Physical Exam   Constitutional: She is oriented to person, place, and time. She appears well-developed and well-nourished.   HENT:   Head: Normocephalic.   Eyes: Pupils are equal, round, and reactive to light.   Neck: Normal range of motion.   Cardiovascular: Normal rate, regular rhythm and intact distal pulses.    Murmur heard.  Pulmonary/Chest: Effort normal and breath sounds normal. No respiratory distress. She has no wheezes. She has no rales.   Abdominal: Soft. Bowel sounds are normal. She exhibits no distension. There is no tenderness.   Musculoskeletal: Normal range of motion. She exhibits no edema.   Neurological: She is alert and oriented to person, place, and time.   Skin: Skin is warm and dry. No pallor.   Psychiatric: She has a normal mood and affect.   Nursing note and vitals reviewed.       ECG 12 Lead  Date/Time: 12/22/2017 11:10 AM  Performed by: ISAIAS GUAMAN  Authorized by: ISAIAS GUAMAN   Rhythm: sinus rhythm  Rate: normal  BPM: 71  Conduction: 1st degree  Clinical impression: abnormal ECG  Comments: Sinus arrhythmia   ms  QTc 383 ms                Assessment/Plan    Heart rate and blood pressure stable.  We discussed her chest pain in detail as well as her previous cardiac findings.  She was given the option of trying medical management with addition of long-acting nitrates or repeating the stress test.  She wanted to repeat the stress test to evaluate for any ischemic changes.  She has requested this be scheduled at [facility].  She will undergo stress and echo to evaluate for any ischemic burden or change in the LV function.  She preferred to walk on the treadmill rather than chemical.  She was given nitro spray and advised on how to use.  Based on the findings, further recommendations will be made.  She has an appointment in March she can keep or we can adjust based on stress findings.    Mago was seen today for follow-up, chest pain, egd, medication change,  numbness and med refill.    Diagnoses and all orders for this visit:    Stable angina pectoris  -     Stress Test With Myocardial Perfusion One Day; Future  -     Adult Transthoracic Echo Complete W/ Cont if Necessary Per Protocol; Future    Coronary artery disease of native artery of native heart with stable angina pectoris  -     Stress Test With Myocardial Perfusion One Day; Future  -     Adult Transthoracic Echo Complete W/ Cont if Necessary Per Protocol; Future    Essential hypertension  -     Stress Test With Myocardial Perfusion One Day; Future  -     Adult Transthoracic Echo Complete W/ Cont if Necessary Per Protocol; Future    Other hyperlipidemia  -     Stress Test With Myocardial Perfusion One Day; Future  -     Adult Transthoracic Echo Complete W/ Cont if Necessary Per Protocol; Future    Other fatigue  -     Stress Test With Myocardial Perfusion One Day; Future  -     Adult Transthoracic Echo Complete W/ Cont if Necessary Per Protocol; Future    Other orders  -     nitroglycerin (NITROLINGUAL) 0.4 MG/SPRAY spray; Place 1 spray under the tongue Every 5 (Five) Minutes As Needed for Chest Pain.  -     ECG 12 Lead                    Electronically signed by KIRILL Duarte,  December 22, 2017 11:12 AM

## 2017-12-22 PROCEDURE — 93000 ELECTROCARDIOGRAM COMPLETE: CPT | Performed by: NURSE PRACTITIONER

## 2018-01-08 ENCOUNTER — HOSPITAL ENCOUNTER (OUTPATIENT)
Dept: CARDIOLOGY | Facility: HOSPITAL | Age: 78
Discharge: HOME | End: 2018-01-08

## 2018-01-08 ENCOUNTER — OUTSIDE FACILITY SERVICE (OUTPATIENT)
Dept: CARDIOLOGY | Facility: CLINIC | Age: 78
End: 2018-01-08

## 2018-01-08 DIAGNOSIS — I20.8 STABLE ANGINA PECTORIS (HCC): ICD-10-CM

## 2018-01-08 DIAGNOSIS — E78.49 OTHER HYPERLIPIDEMIA: ICD-10-CM

## 2018-01-08 DIAGNOSIS — R53.83 OTHER FATIGUE: ICD-10-CM

## 2018-01-08 DIAGNOSIS — I25.118 CORONARY ARTERY DISEASE OF NATIVE ARTERY OF NATIVE HEART WITH STABLE ANGINA PECTORIS (HCC): ICD-10-CM

## 2018-01-08 DIAGNOSIS — I10 ESSENTIAL HYPERTENSION: ICD-10-CM

## 2018-01-08 LAB
MAXIMAL PREDICTED HEART RATE: 142 BPM
MAXIMAL PREDICTED HEART RATE: 142 BPM
STRESS TARGET HR: 121 BPM
STRESS TARGET HR: 121 BPM

## 2018-01-08 PROCEDURE — 78452 HT MUSCLE IMAGE SPECT MULT: CPT

## 2018-01-08 PROCEDURE — 93306 TTE W/DOPPLER COMPLETE: CPT | Performed by: INTERNAL MEDICINE

## 2018-01-08 PROCEDURE — 93017 CV STRESS TEST TRACING ONLY: CPT

## 2018-01-08 PROCEDURE — A9500 TC99M SESTAMIBI: HCPCS | Performed by: INTERNAL MEDICINE

## 2018-01-08 PROCEDURE — 93306 TTE W/DOPPLER COMPLETE: CPT

## 2018-01-08 PROCEDURE — 78452 HT MUSCLE IMAGE SPECT MULT: CPT | Performed by: INTERNAL MEDICINE

## 2018-01-08 PROCEDURE — 0 TECHNETIUM SESTAMIBI: Performed by: INTERNAL MEDICINE

## 2018-01-08 PROCEDURE — 93018 CV STRESS TEST I&R ONLY: CPT | Performed by: INTERNAL MEDICINE

## 2018-01-08 RX ADMIN — TECHNETIUM TC 99M SESTAMIBI 1 DOSE: 1 INJECTION INTRAVENOUS at 13:00

## 2018-01-10 ENCOUNTER — TELEPHONE (OUTPATIENT)
Dept: CARDIOLOGY | Facility: CLINIC | Age: 78
End: 2018-01-10

## 2018-01-10 NOTE — TELEPHONE ENCOUNTER
Patient aware of stress test and echo results and recommendations.  Negative for ischemia and normal LV function. Continue home medications.

## 2018-03-28 ENCOUNTER — OFFICE VISIT (OUTPATIENT)
Dept: CARDIOLOGY | Facility: CLINIC | Age: 78
End: 2018-03-28

## 2018-03-28 VITALS
SYSTOLIC BLOOD PRESSURE: 102 MMHG | WEIGHT: 137 LBS | HEART RATE: 68 BPM | HEIGHT: 61 IN | DIASTOLIC BLOOD PRESSURE: 62 MMHG | BODY MASS INDEX: 25.86 KG/M2

## 2018-03-28 DIAGNOSIS — R01.1 MURMUR, CARDIAC: ICD-10-CM

## 2018-03-28 DIAGNOSIS — I10 ESSENTIAL HYPERTENSION: ICD-10-CM

## 2018-03-28 DIAGNOSIS — E78.00 HYPERCHOLESTEREMIA: ICD-10-CM

## 2018-03-28 DIAGNOSIS — I25.9 IHD (ISCHEMIC HEART DISEASE): Primary | ICD-10-CM

## 2018-03-28 DIAGNOSIS — K21.9 GASTROESOPHAGEAL REFLUX DISEASE WITHOUT ESOPHAGITIS: ICD-10-CM

## 2018-03-28 PROBLEM — E78.5 HYPERLIPEMIA: Status: RESOLVED | Noted: 2017-03-15 | Resolved: 2018-03-28

## 2018-03-28 PROBLEM — I25.10 CORONARY ARTERY DISEASE INVOLVING NATIVE CORONARY ARTERY OF NATIVE HEART WITHOUT ANGINA PECTORIS: Status: RESOLVED | Noted: 2017-03-15 | Resolved: 2018-03-28

## 2018-03-28 PROCEDURE — 99213 OFFICE O/P EST LOW 20 MIN: CPT | Performed by: INTERNAL MEDICINE

## 2018-03-28 NOTE — PROGRESS NOTES
Chief Complaint   Patient presents with   • Follow-up     For cardiac management. She is following with Dr Shukal next week due to GERD and Epigastric pain. She reports will occasionally having burning pains across chest. Last labs last fall at [facility]. She does not need refills at this time.       CARDIAC COMPLAINTS  heartburn        Subjective   Mago Gupta is a 77 y.o. female came in today for her follow up visit.  She has history of IHD diagnosed in 2014 when she underwent stenting of the LAD.  Her last cardiac workup done recently showed she had excellence effort tolerance, normal LV function and no ischemia.  She came today, stating she has not had been having any chest pain but she has been having some burning pain across her chest.  She has episodes of nausea and vomiting.  She is going to see gastroenterologist next week regarding this.  She has not had any melena.  Her lab work is followed at your office and according to her everything was WNL.                Cardiac History  Past Surgical History:   Procedure Laterality Date   • CARDIAC CATHETERIZATION  08/18/2014    Stent in [place] (LAD)   • CARDIAC CATHETERIZATION  02/07/2017    Patent stent in LAD. Small Distal LAD and PDA   • CARDIOVASCULAR STRESS TEST  09/15/2014    @[facility]. CVA- 5 Min, 95% THR. Negative.   • CARDIOVASCULAR STRESS TEST  01/24/2017    6 Min, 85% THR. Apical ischemia.   • CARDIOVASCULAR STRESS TEST  01/08/2018    (Mod) 9 Min, 74% THR. BP- 142/86. Negative   • ECHO - CONVERTED  01/24/2017    EF > 65%   • ECHO - CONVERTED  01/08/2018    EF > 65%       Current Outpatient Prescriptions   Medication Sig Dispense Refill   • Arginine 500 MG capsule Take  by mouth 2 (Two) Times a Day.     • Calcium Citrate-Vitamin D (CALCIUM + D PO) Take  by mouth Daily.     • clopidogrel (PLAVIX) 75 MG tablet Take 75 mg by mouth Daily.     • diltiaZEM CD (CARDIZEM CD) 240 MG 24 hr capsule Take 240 mg by mouth Daily.     • ezetimibe (ZETIA) 10 MG tablet  Take 10 mg by mouth Daily.     • lisinopril (PRINIVIL,ZESTRIL) 5 MG tablet TAKE ONE TABLET BY MOUTH DAILY 30 tablet 11   • Multiple Vitamins-Minerals (MULTI COMPLETE PO) Take  by mouth.     • Multiple Vitamins-Minerals (PRESERVISION AREDS PO) Take  by mouth 2 (Two) Times a Day.     • nitroglycerin (NITROLINGUAL) 0.4 MG/SPRAY spray Place 1 spray under the tongue Every 5 (Five) Minutes As Needed for Chest Pain. 1 each 3   • pantoprazole (PROTONIX) 40 MG EC tablet Take 40 mg by mouth 2 (Two) Times a Day.       No current facility-administered medications for this visit.        Allergies  :  Celebrex [celecoxib]; Codeine; Penicillins; and Dexilant [dexlansoprazole]       Past Medical History:   Diagnosis Date   • Cataract     Removed bilaterally   • Coronary artery disease     s/p stenting in 2014   • GERD (gastroesophageal reflux disease)    • H/O total hysterectomy    • Hiatal hernia    • History of cholecystectomy    • History of eye surgery     multiple eye surgeries   • Hx of breast reduction, elective    • Hyperlipidemia    • Hypertension    • Rheumatic fever     When she was 11   • Valvular disease        Social History     Social History   • Marital status:      Spouse name: N/A   • Number of children: N/A   • Years of education: N/A     Occupational History   • Not on file.     Social History Main Topics   • Smoking status: Never Smoker   • Smokeless tobacco: Never Used   • Alcohol use No   • Drug use: No   • Sexual activity: Not on file     Other Topics Concern   • Not on file     Social History Narrative   • No narrative on file       Family History   Problem Relation Age of Onset   • Heart attack Mother    • Diabetes Mother    • Cancer Mother    • Heart attack Father    • Heart attack Brother      CABG   • Diabetes Brother    • Heart attack Brother      CABG   • Heart attack Brother      CABG   • Heart attack Brother      CABG   • Heart disease Sister    • Diabetes Daughter    • Hypertension Daughter   "  • Hyperlipidemia Daughter    • Hypertension Daughter        Review of Systems   Constitution: Negative for decreased appetite and malaise/fatigue.   HENT: Negative for congestion and sore throat.    Eyes: Negative for blurred vision.   Respiratory: Negative for shortness of breath and snoring.    Endocrine: Negative for cold intolerance and heat intolerance.   Hematologic/Lymphatic: Negative for adenopathy. Does not bruise/bleed easily.   Skin: Negative for itching, nail changes and skin cancer.   Musculoskeletal: Negative for arthritis and myalgias.   Gastrointestinal: Positive for dysphagia, heartburn and nausea. Negative for abdominal pain.   Genitourinary: Negative for bladder incontinence and frequency.   Neurological: Negative for dizziness, light-headedness, seizures and vertigo.   Psychiatric/Behavioral: Negative for altered mental status.   Allergic/Immunologic: Negative for environmental allergies and hives.       Diabetes- No  Thyroid- normal    Objective     /62 (BP Location: Left arm)   Pulse 68   Ht 154.9 cm (60.98\")   Wt 62.1 kg (137 lb)   BMI 25.90 kg/m²     Physical Exam   Constitutional: She is oriented to person, place, and time. She appears well-developed and well-nourished.   HENT:   Head: Normocephalic.   Nose: Nose normal.   Eyes: EOM are normal. Pupils are equal, round, and reactive to light.   Neck: Normal range of motion. Neck supple.   Cardiovascular: Normal rate, regular rhythm, S1 normal and S2 normal.    Murmur heard.  Pulmonary/Chest: Effort normal and breath sounds normal.   Abdominal: Soft. Bowel sounds are normal.   Musculoskeletal: Normal range of motion. She exhibits edema.   Neurological: She is alert and oriented to person, place, and time.   Skin: Skin is warm and dry.   Psychiatric: She has a normal mood and affect.     Procedures            Assessment/Plan     Mago was seen today for follow-up.    Diagnoses and all orders for this visit:    IHD (ischemic heart " disease)    Essential hypertension    Hypercholesteremia    Gastroesophageal reflux disease without esophagitis    Murmur, cardiac         HR and BP appears stable.  At this time, no changes in the medication made.  I did explain to her she can go off the ASA and Plavix about 5 days prior to meeting the gastroenterologist.  I did give her a paper clearing her for the scope and also gave clearance to stop the Plavix and ASA.  Overall cardiac status appears stable.  I will see her back in 6 months or sooner if needed.                Electronically signed by Kris Viera MD March 28, 2018 11:17 AM

## 2018-08-01 ENCOUNTER — OFFICE VISIT (OUTPATIENT)
Dept: CARDIOLOGY | Facility: CLINIC | Age: 78
End: 2018-08-01

## 2018-08-01 VITALS
BODY MASS INDEX: 25.49 KG/M2 | HEART RATE: 72 BPM | WEIGHT: 135 LBS | DIASTOLIC BLOOD PRESSURE: 80 MMHG | SYSTOLIC BLOOD PRESSURE: 110 MMHG | HEIGHT: 61 IN

## 2018-08-01 DIAGNOSIS — E78.00 HYPERCHOLESTEREMIA: ICD-10-CM

## 2018-08-01 DIAGNOSIS — R07.89 OTHER CHEST PAIN: ICD-10-CM

## 2018-08-01 DIAGNOSIS — R20.0 NUMBNESS AND TINGLING IN LEFT ARM: ICD-10-CM

## 2018-08-01 DIAGNOSIS — I25.9 IHD (ISCHEMIC HEART DISEASE): Primary | ICD-10-CM

## 2018-08-01 DIAGNOSIS — R09.89 OTHER SPECIFIED SYMPTOMS AND SIGNS INVOLVING THE CIRCULATORY AND RESPIRATORY SYSTEMS: ICD-10-CM

## 2018-08-01 DIAGNOSIS — R20.2 NUMBNESS AND TINGLING IN LEFT ARM: ICD-10-CM

## 2018-08-01 DIAGNOSIS — I10 ESSENTIAL HYPERTENSION: ICD-10-CM

## 2018-08-01 DIAGNOSIS — F41.9 ANXIETY: ICD-10-CM

## 2018-08-01 DIAGNOSIS — R01.1 MURMUR, CARDIAC: ICD-10-CM

## 2018-08-01 DIAGNOSIS — E66.3 OVERWEIGHT: ICD-10-CM

## 2018-08-01 PROCEDURE — 99214 OFFICE O/P EST MOD 30 MIN: CPT | Performed by: NURSE PRACTITIONER

## 2018-08-01 RX ORDER — PAROXETINE 10 MG/1
10 TABLET, FILM COATED ORAL NIGHTLY
Qty: 30 TABLET | Refills: 11 | Status: SHIPPED | OUTPATIENT
Start: 2018-08-01 | End: 2019-08-09 | Stop reason: ALTCHOICE

## 2018-08-01 NOTE — PROGRESS NOTES
Chief Complaint   Patient presents with   • Follow-up     For cardiac management. Patient is not on aspirin. Reports that she has been having chest pains and having tingling pain on her left side and down her arm. Reports    • Med Refill     Does not need refills.        Cardiac Complaints  chest pressure/discomfort, tingling      Subjective   Mago Gupta is a 78 y.o. female with hypertension, hyperlipidemia, and ischemic heart disease diagnosed in 2014 when she underwent stenting of the LAD in [place].  The details are not available.  She was referred here to have cardiology locally and underwent work up including stress and echo which showed good LV function and apical ischemia. Cath was later advised that showed patent LAD and small vessel disease, medical management advised.  She was started on L.arginine in the hospital after cath.  She returned in December for chest pain and cardiac workup was advised.  Cardiac workup with stress and echo were recommended.  Stress was negative for ischemia and EF was normal.  At March follow up, patient denied new concerns and current was continued.  She returns today for follow up and reports continued issues with chest pain that she is attributing to stress.  She does state she is not sleeping well at night and only has about 1-2 hours at night for caring for handicapped daughter with DM and having to check blood sugars.  She does state with exertion she has no pain and is able to exercise walking about a mile a day without concerns.  She does all her house work at home without concerns and is busy most days.  She does have some pain in her chest and some tingling in her chest and arm but states this is when she is feeling anxious/overwhelmed.  Patient would like to try paxil in regards.  Labs and most refills with PCP.      Cardiac History  Past Surgical History:   Procedure Laterality Date   • CARDIAC CATHETERIZATION  08/18/2014    Stent in [place] (LAD)   • CARDIAC  CATHETERIZATION  02/07/2017    Patent stent in LAD. Small Distal LAD and PDA   • CARDIOVASCULAR STRESS TEST  09/15/2014    @[facility] [place]. CVA- 5 Min, 95% THR. Negative.   • CARDIOVASCULAR STRESS TEST  01/24/2017    6 Min, 85% THR. Apical ischemia.   • CARDIOVASCULAR STRESS TEST  01/08/2018    (Mod) 9 Min, 74% THR. BP- 142/86. Negative   • ECHO - CONVERTED  01/24/2017    EF > 65%   • ECHO - CONVERTED  01/08/2018    EF > 65%       Current Outpatient Prescriptions   Medication Sig Dispense Refill   • Arginine 500 MG capsule Take  by mouth 2 (Two) Times a Day.     • Calcium Citrate-Vitamin D (CALCIUM + D PO) Take  by mouth Daily.     • clopidogrel (PLAVIX) 75 MG tablet Take 75 mg by mouth Daily.     • diltiaZEM CD (CARDIZEM CD) 240 MG 24 hr capsule Take 240 mg by mouth Daily.     • ezetimibe (ZETIA) 10 MG tablet Take 10 mg by mouth Daily.     • lisinopril (PRINIVIL,ZESTRIL) 5 MG tablet TAKE ONE TABLET BY MOUTH DAILY 30 tablet 11   • Multiple Vitamins-Minerals (MULTI COMPLETE PO) Take  by mouth.     • Multiple Vitamins-Minerals (PRESERVISION AREDS PO) Take  by mouth 2 (Two) Times a Day.     • nitroglycerin (NITROLINGUAL) 0.4 MG/SPRAY spray Place 1 spray under the tongue Every 5 (Five) Minutes As Needed for Chest Pain. 1 each 3   • pantoprazole (PROTONIX) 40 MG EC tablet Take 40 mg by mouth 2 (Two) Times a Day.     • Phenazopyridine HCl (AZO URINARY PAIN RELIEF PO) Take  by mouth.     • PARoxetine (PAXIL) 10 MG tablet Take 1 tablet by mouth Every Night. 30 tablet 11     No current facility-administered medications for this visit.        Celebrex [celecoxib]; Codeine; Penicillins; and Dexilant [dexlansoprazole]    Past Medical History:   Diagnosis Date   • Cataract     Removed bilaterally   • Coronary artery disease     s/p stenting in 2014   • GERD (gastroesophageal reflux disease)    • H/O total hysterectomy    • Hiatal hernia    • History of cholecystectomy    • History of eye surgery     multiple eye surgeries   • Hx of breast  "reduction, elective    • Hyperlipidemia    • Hypertension    • Rheumatic fever     When she was 11   • Valvular disease        Social History     Social History   • Marital status:      Spouse name: N/A   • Number of children: N/A   • Years of education: N/A     Occupational History   • Not on file.     Social History Main Topics   • Smoking status: Never Smoker   • Smokeless tobacco: Never Used   • Alcohol use No   • Drug use: No   • Sexual activity: Not on file     Other Topics Concern   • Not on file     Social History Narrative   • No narrative on file       Family History   Problem Relation Age of Onset   • Heart attack Mother    • Diabetes Mother    • Cancer Mother    • Heart attack Father    • Heart attack Brother         CABG   • Diabetes Brother    • Heart attack Brother         CABG   • Heart attack Brother         CABG   • Heart attack Brother         CABG   • Heart disease Sister    • Diabetes Daughter    • Hypertension Daughter    • Hyperlipidemia Daughter    • Hypertension Daughter        Review of Systems   Constitution: Negative for weakness and malaise/fatigue.   Cardiovascular: Positive for chest pain. Negative for dyspnea on exertion, irregular heartbeat, leg swelling, near-syncope, palpitations and syncope.   Respiratory: Negative for cough, shortness of breath and wheezing.    Musculoskeletal: Negative for back pain, joint swelling and muscle weakness.   Gastrointestinal: Negative for anorexia, heartburn, nausea and vomiting.   Genitourinary: Negative for dysuria, hematuria, hesitancy and nocturia.   Neurological: Positive for numbness and paresthesias. Negative for excessive daytime sleepiness, light-headedness and loss of balance.   Psychiatric/Behavioral: Negative for depression and memory loss. The patient is nervous/anxious.            Objective     /80 (BP Location: Left arm)   Pulse 72   Ht 154.9 cm (60.98\")   Wt 61.2 kg (135 lb)   BMI 25.52 kg/m²     Physical Exam "   Constitutional: She is oriented to person, place, and time. She appears well-developed and well-nourished.   HENT:   Head: Normocephalic and atraumatic.   Eyes: Pupils are equal, round, and reactive to light. EOM are normal.   Neck: Normal range of motion. Neck supple.   Cardiovascular: Normal rate and regular rhythm.    Murmur heard.  Pulmonary/Chest: Effort normal and breath sounds normal.   Abdominal: Soft.   Musculoskeletal: Normal range of motion.   Neurological: She is alert and oriented to person, place, and time.   Skin: Skin is warm and dry.   Psychiatric: She has a normal mood and affect. Her behavior is normal.       Procedures    Assessment/Plan     HR and BP are stable.  HTN well managed on current regimen, no adjustment advised.  For chest discomfort, L.arginine will be increased to 1000mg in AM and 500mg in PM.  We did discuss repeating stress test since she has continued chest discomfort, but she would like to make medication adjustment first.  Most recent cath done showed patent stent to LAD continued plavix and zetia therapy continued. For stress and anxiety, paxil recommended for anxiety per request.  Patient advised to begin at 10mg nightly and discuss any adjustment with your office. Arterial US of LUE recommended to assess for any stenosis that may be causing pain and numbness, more recommendations to follow.  Labs including FLP with your office and hyperlipidemia management, could we have most recent for our review.  BMI elevated at 25.52, good cardiac diet, limited caloric intake, and activity as tolerated advised.  6 month follow up recommended or sooner if needed        Problems Addressed this Visit        Cardiovascular and Mediastinum    Essential hypertension    Hypercholesteremia    IHD (ischemic heart disease) - Primary    Murmur, cardiac      Other Visit Diagnoses     Numbness and tingling in left arm        Relevant Orders    US Arterial Doppler Upper Extremity Left    Other chest  pain        Other specified symptoms and signs involving the circulatory and respiratory systems         Relevant Orders    US Arterial Doppler Upper Extremity Left    Anxiety        Overweight              Patient's Body mass index is 25.52 kg/m². BMI is above normal parameters. Recommendations include: nutrition counseling.        Electronically signed by KIRILL Brooks August 1, 2018 5:21 PM

## 2018-08-03 ENCOUNTER — TELEPHONE (OUTPATIENT)
Dept: CARDIOLOGY | Facility: CLINIC | Age: 78
End: 2018-08-03

## 2018-08-03 NOTE — TELEPHONE ENCOUNTER
Insurance is requiring PA for Paroxetine. Is it ok to refer to PCP for this medication? We do not have significant information to insure approval.

## 2018-09-11 ENCOUNTER — TELEPHONE (OUTPATIENT)
Dept: CARDIOLOGY | Facility: CLINIC | Age: 78
End: 2018-09-11

## 2018-09-11 NOTE — TELEPHONE ENCOUNTER
----- Message from KIRILL Stover sent at 9/11/2018 12:22 PM EDT -----  Normal VERA.  Most recent showed irregular heart rate.  Please have her come tomorrow to  for EKG.

## 2018-09-11 NOTE — TELEPHONE ENCOUNTER
Patient aware of normal VERA. Patient aware that it did show irregular heart rate and recommendation for EKG.     Mary Kate, can you put patient on for EKG per Tracy at 9:15 tomorrow in [place]?

## 2018-09-12 ENCOUNTER — CLINICAL SUPPORT (OUTPATIENT)
Dept: CARDIOLOGY | Facility: CLINIC | Age: 78
End: 2018-09-12

## 2018-09-12 ENCOUNTER — TELEPHONE (OUTPATIENT)
Dept: CARDIOLOGY | Facility: CLINIC | Age: 78
End: 2018-09-12

## 2018-09-12 DIAGNOSIS — R09.89 PULSE IRREGULARITY: Primary | ICD-10-CM

## 2018-09-12 PROCEDURE — 93000 ELECTROCARDIOGRAM COMPLETE: CPT | Performed by: NURSE PRACTITIONER

## 2018-09-12 NOTE — PROGRESS NOTES
Procedure     ECG 12 Lead  Date/Time: 9/12/2018 3:42 PM  Performed by: DANIA ORTIZ  Authorized by: DANIA ORTIZ   Rhythm: sinus bradycardia  Ectopy: atrial premature contractions  BPM: 58  Conduction: 1st degree  Clinical impression: abnormal ECG

## 2018-09-24 RX ORDER — LISINOPRIL 5 MG/1
TABLET ORAL
Qty: 90 TABLET | Refills: 3 | Status: SHIPPED | OUTPATIENT
Start: 2018-09-24 | End: 2019-08-07 | Stop reason: SDUPTHER

## 2018-10-15 RX ORDER — LISINOPRIL 5 MG/1
TABLET ORAL
Qty: 90 TABLET | Refills: 10 | OUTPATIENT
Start: 2018-10-15

## 2018-10-23 RX ORDER — LISINOPRIL 5 MG/1
TABLET ORAL
Qty: 90 TABLET | Refills: 10 | OUTPATIENT
Start: 2018-10-23

## 2019-02-01 NOTE — PROGRESS NOTES
Chief Complaint   Patient presents with   • Follow-up     For cardiac management. Patient is not on aspirin. Reports that she has been diagnosed with a hiatal hernia. Reports that she has had some chest pains that are like tingling. Reports that she does have some shortness of breath. Last lab work was done in the fall a the health [facility].    • Med Refill     PCP does medication refills. Brought medication list with visit.        Cardiac Complaints  chest pressure/discomfort and dyspnea      Subjective   Mago Gupta is a 78 y.o. female with hypertension, hyperlipidemia, and ischemic heart disease diagnosed in 2014 when she underwent stenting of the LAD in [place].  The details are not available.  She was referred here to have cardiology locally and underwent work up including stress and echo which showed good LV function and apical ischemia. Cath was later advised that showed patent LAD stent and small vessel disease, medical management advised.  She was started on L.arginine in the hospital after cath. She returned in December 2017 for chest pain and cardiac workup was advised.  Cardiac workup with stress and echo were recommended.  Stress was negative for ischemia and EF was normal.  She reported chest pain at last visit that she attributed to stress and lack of sleep.  Repeat stress testing was discussed, but she declined as she wanted something for her nerves first.  She also reported pain and numbness of LUE and arterial US was recommended which was negative but showed irregular heart rate.  She returned for EKG but no abnormal heart rhythm was noted.  Patient returns today for follow up and states she had a recent colonoscopy and EGD with Dr. Shukla. Patient reports large hiatal hernia was diagnosed.  She does report some pain in her chest sometimes that she states is more midline and sharp in nature, which she feels is coming from the hernia.  She also reports at times noticing a tingling sensation to the  left chest area that just seems like it will come and go and not related to anything in particular.  Shortness of breath has persisted but does not seem to be any worse and is only noticed if she is going about her activity too quickly.  She still reports being very active around her home caring for her handicap daughter.  Labs she admits are done in the fall at the [facility] and all looked okay.  No refills needed as she reports with PCP as well.          Cardiac History  Past Surgical History:   Procedure Laterality Date   • CARDIAC CATHETERIZATION  08/18/2014    Stent in [place] (LAD)   • CARDIAC CATHETERIZATION  02/07/2017    Patent stent in LAD. Small Distal LAD and PDA   • CARDIOVASCULAR STRESS TEST  09/15/2014    @[facility]. CVA- 5 Min, 95% THR. Negative.   • CARDIOVASCULAR STRESS TEST  01/24/2017    6 Min, 85% THR. Apical ischemia.   • CARDIOVASCULAR STRESS TEST  01/08/2018    (Mod) 9 Min, 74% THR. BP- 142/86. Negative   • ECHO - CONVERTED  01/24/2017    EF > 65%   • ECHO - CONVERTED  01/08/2018    EF > 65%   • OTHER SURGICAL HISTORY  09/10/2018    LUE artertial US:  Negative       Current Outpatient Medications   Medication Sig Dispense Refill   • Arginine 500 MG capsule Take  by mouth 2 (Two) Times a Day.     • Calcium Citrate-Vitamin D (CALCIUM + D PO) Take  by mouth Daily.     • clopidogrel (PLAVIX) 75 MG tablet Take 75 mg by mouth Daily.     • diltiaZEM CD (CARDIZEM CD) 240 MG 24 hr capsule Take 240 mg by mouth Daily.     • ezetimibe (ZETIA) 10 MG tablet Take 10 mg by mouth Daily.     • lisinopril (PRINIVIL,ZESTRIL) 5 MG tablet TAKE ONE TABLET BY MOUTH DAILY 90 tablet 3   • Multiple Vitamins-Minerals (MULTI COMPLETE PO) Take  by mouth.     • Multiple Vitamins-Minerals (PRESERVISION AREDS PO) Take  by mouth 2 (Two) Times a Day.     • nitroglycerin (NITROLINGUAL) 0.4 MG/SPRAY spray Place 1 spray under the tongue Every 5 (Five) Minutes As Needed for Chest Pain. 1 each 3   • pantoprazole (PROTONIX) 40 MG EC  tablet Take 40 mg by mouth Daily.     • PARoxetine (PAXIL) 10 MG tablet Take 1 tablet by mouth Every Night. 30 tablet 11   • Phenazopyridine HCl (AZO URINARY PAIN RELIEF PO) Take  by mouth.       No current facility-administered medications for this visit.        Celebrex [celecoxib]; Codeine; Penicillins; and Dexilant [dexlansoprazole]    Past Medical History:   Diagnosis Date   • Cataract     Removed bilaterally   • Coronary artery disease     s/p stenting in 2014   • GERD (gastroesophageal reflux disease)    • H/O total hysterectomy    • Hiatal hernia    • History of cholecystectomy    • History of eye surgery     multiple eye surgeries   • Hx of breast reduction, elective    • Hyperlipidemia    • Hypertension    • Rheumatic fever     When she was 11   • Valvular disease        Social History     Socioeconomic History   • Marital status:      Spouse name: Not on file   • Number of children: Not on file   • Years of education: Not on file   • Highest education level: Not on file   Social Needs   • Financial resource strain: Not on file   • Food insecurity - worry: Not on file   • Food insecurity - inability: Not on file   • Transportation needs - medical: Not on file   • Transportation needs - non-medical: Not on file   Occupational History   • Not on file   Tobacco Use   • Smoking status: Never Smoker   • Smokeless tobacco: Never Used   Substance and Sexual Activity   • Alcohol use: No   • Drug use: No   • Sexual activity: Not on file   Other Topics Concern   • Not on file   Social History Narrative   • Not on file       Family History   Problem Relation Age of Onset   • Heart attack Mother    • Diabetes Mother    • Cancer Mother    • Heart attack Father    • Heart attack Brother         CABG   • Diabetes Brother    • Heart attack Brother         CABG   • Heart attack Brother         CABG   • Heart attack Brother         CABG   • Heart disease Sister    • Diabetes Daughter    • Hypertension Daughter    •  "Hyperlipidemia Daughter    • Hypertension Daughter        Review of Systems   Constitution: Negative for weakness and malaise/fatigue.   Cardiovascular: Positive for chest pain and dyspnea on exertion. Negative for claudication, irregular heartbeat, leg swelling, orthopnea, palpitations and syncope.   Respiratory: Positive for shortness of breath. Negative for cough and wheezing.    Musculoskeletal: Negative for back pain, joint pain and joint swelling.   Gastrointestinal: Positive for heartburn. Negative for anorexia, nausea and vomiting.   Genitourinary: Negative for dysuria, hematuria, hesitancy and nocturia.   Neurological: Negative for dizziness, light-headedness and loss of balance.   Psychiatric/Behavioral: Negative for depression and memory loss. The patient is nervous/anxious.            Objective     /80 (BP Location: Left arm)   Pulse 68   Ht 154.9 cm (60.98\")   Wt 61.2 kg (135 lb)   BMI 25.52 kg/m²     Physical Exam   Constitutional: She is oriented to person, place, and time. She appears well-developed and well-nourished.   HENT:   Head: Normocephalic and atraumatic.   Eyes: EOM are normal. Pupils are equal, round, and reactive to light.   Neck: Normal range of motion. Neck supple.   Cardiovascular: Normal rate and regular rhythm.   Murmur heard.  Pulmonary/Chest: Effort normal and breath sounds normal.   Abdominal: Soft.   Musculoskeletal: Normal range of motion.   Neurological: She is alert and oriented to person, place, and time.   Skin: Skin is warm and dry.   Psychiatric: She has a normal mood and affect. Her behavior is normal.       Procedures    Assessment/Plan     HR and BP are stable today.  HTN is well managed on current.  For history of IHD, she has continued on plavix therapy and tolerates well with bleeding and bruising denied.  For small vessel disease, she has continued on L.arginine at 500 mg to aid in chest discomfort as she could not take higher dosing as it made her sick.  " She does continue to have chest pain from time to time which appears very atypical in nature and denies having to use her NTG spray in regards.  She was encouraged to use if needed.  No new workup recommended at this time as no new or worsening concerns noted. If chest pain should worsen, patient advised to call for further recommendations. Hyperlipidemia remains managed with zetia therapy.  Labs including FLP monitored with PCP.  Could we have next for review?  No refills needed.  BMI stable at 25.52, continued cardiac diet and activity as tolerated advised.  6 month follow up scheduled or sooner if needed.          Problems Addressed this Visit        Cardiovascular and Mediastinum    Essential hypertension    Hypercholesteremia    IHD (ischemic heart disease) - Primary    Murmur, cardiac      Other Visit Diagnoses     Anginal syndrome (CMS/HCC)        Shortness of breath        Small vessel disease              Patient's Body mass index is 25.52 kg/m². BMI is above normal parameters. Recommendations include: nutrition counseling.            Electronically signed by KIRILL Brooks February 6, 2019 4:19 PM

## 2019-02-06 ENCOUNTER — OFFICE VISIT (OUTPATIENT)
Dept: CARDIOLOGY | Facility: CLINIC | Age: 79
End: 2019-02-06

## 2019-02-06 VITALS
SYSTOLIC BLOOD PRESSURE: 120 MMHG | WEIGHT: 135 LBS | HEIGHT: 61 IN | HEART RATE: 68 BPM | BODY MASS INDEX: 25.49 KG/M2 | DIASTOLIC BLOOD PRESSURE: 80 MMHG

## 2019-02-06 DIAGNOSIS — I10 ESSENTIAL HYPERTENSION: ICD-10-CM

## 2019-02-06 DIAGNOSIS — I20.9 ANGINAL SYNDROME (HCC): ICD-10-CM

## 2019-02-06 DIAGNOSIS — I73.9 SMALL VESSEL DISEASE (HCC): ICD-10-CM

## 2019-02-06 DIAGNOSIS — R01.1 MURMUR, CARDIAC: ICD-10-CM

## 2019-02-06 DIAGNOSIS — E78.00 HYPERCHOLESTEREMIA: ICD-10-CM

## 2019-02-06 DIAGNOSIS — R06.02 SHORTNESS OF BREATH: ICD-10-CM

## 2019-02-06 DIAGNOSIS — I25.9 IHD (ISCHEMIC HEART DISEASE): Primary | ICD-10-CM

## 2019-02-06 PROCEDURE — 99213 OFFICE O/P EST LOW 20 MIN: CPT | Performed by: NURSE PRACTITIONER

## 2019-03-13 ENCOUNTER — TELEPHONE (OUTPATIENT)
Dept: CARDIOLOGY | Facility: CLINIC | Age: 79
End: 2019-03-13

## 2019-03-13 DIAGNOSIS — R94.31 ABNORMAL EKG: Primary | ICD-10-CM

## 2019-03-13 DIAGNOSIS — I25.9 IHD (ISCHEMIC HEART DISEASE): ICD-10-CM

## 2019-03-13 DIAGNOSIS — R07.89 OTHER CHEST PAIN: ICD-10-CM

## 2019-03-13 NOTE — TELEPHONE ENCOUNTER
Patient states that she saw PCP yesterday and that they did an EKG. She states that PCP said that she needed to see our office. She reports that she has been having chest pain that is a tingly, strong pain. She states it has been going on for a while. I will call PCP office to get copy of EKG.      What are your recommendations?

## 2019-03-13 NOTE — TELEPHONE ENCOUNTER
Thank you.  Patient was seen 2/6/2019.  Does not need appointment.  Repeat stress test in [place].  If negative workup for anxiety with PCP.

## 2019-03-21 ENCOUNTER — HOSPITAL ENCOUNTER (OUTPATIENT)
Dept: CARDIOLOGY | Facility: HOSPITAL | Age: 79
Discharge: HOME | End: 2019-03-21

## 2019-03-21 DIAGNOSIS — R94.31 ABNORMAL EKG: ICD-10-CM

## 2019-03-21 DIAGNOSIS — R07.89 OTHER CHEST PAIN: ICD-10-CM

## 2019-03-21 DIAGNOSIS — I25.9 IHD (ISCHEMIC HEART DISEASE): ICD-10-CM

## 2019-03-21 LAB
BH CV NUCLEAR PRIOR STUDY: 3
BH CV STRESS COMMENTS STAGE 1: NORMAL
BH CV STRESS DOSE REGADENOSON STAGE 1: 0.4
BH CV STRESS DURATION MIN STAGE 1: 0
BH CV STRESS DURATION SEC STAGE 1: 10
BH CV STRESS PROTOCOL 1: NORMAL
BH CV STRESS RECOVERY BP: NORMAL MMHG
BH CV STRESS RECOVERY HR: 103 BPM
BH CV STRESS STAGE 1: 1
LV EF NUC BP: 65 %
MAXIMAL PREDICTED HEART RATE: 141 BPM
PERCENT MAX PREDICTED HR: 80.14 %
STRESS BASELINE BP: NORMAL MMHG
STRESS BASELINE HR: 74 BPM
STRESS PERCENT HR: 94 %
STRESS POST PEAK BP: NORMAL MMHG
STRESS POST PEAK HR: 113 BPM
STRESS TARGET HR: 120 BPM

## 2019-03-21 PROCEDURE — 25010000002 REGADENOSON 0.4 MG/5ML SOLUTION: Performed by: INTERNAL MEDICINE

## 2019-03-21 PROCEDURE — A9500 TC99M SESTAMIBI: HCPCS | Performed by: INTERNAL MEDICINE

## 2019-03-21 PROCEDURE — 0 TECHNETIUM SESTAMIBI: Performed by: INTERNAL MEDICINE

## 2019-03-21 PROCEDURE — 78452 HT MUSCLE IMAGE SPECT MULT: CPT

## 2019-03-21 PROCEDURE — 25010000002 AMINOPHYLLINE PER 250 MG

## 2019-03-21 PROCEDURE — 93018 CV STRESS TEST I&R ONLY: CPT | Performed by: INTERNAL MEDICINE

## 2019-03-21 PROCEDURE — 93017 CV STRESS TEST TRACING ONLY: CPT

## 2019-03-21 PROCEDURE — 78452 HT MUSCLE IMAGE SPECT MULT: CPT | Performed by: INTERNAL MEDICINE

## 2019-03-21 RX ADMIN — REGADENOSON 0.4 MG: 0.08 INJECTION, SOLUTION INTRAVENOUS at 14:15

## 2019-03-21 RX ADMIN — TECHNETIUM TC 99M SESTAMIBI 1 DOSE: 1 INJECTION INTRAVENOUS at 14:15

## 2019-03-21 RX ADMIN — TECHNETIUM TC 99M SESTAMIBI 1 DOSE: 1 INJECTION INTRAVENOUS at 12:19

## 2019-06-05 ENCOUNTER — TELEPHONE (OUTPATIENT)
Dept: CARDIOLOGY | Facility: CLINIC | Age: 79
End: 2019-06-05

## 2019-06-05 NOTE — TELEPHONE ENCOUNTER
[facility] requesting cardiac clearance for EGD, scheduled 6/26/19, and how many days Plavix can be held?    3/12/19-Lexiscan was negative for ischemia, EF 65%  8930-Luvh-Ftndui LAD stent, small distal LAD and PDA  2014-stenting of LAD

## 2019-06-18 ENCOUNTER — TELEPHONE (OUTPATIENT)
Dept: CARDIOLOGY | Facility: CLINIC | Age: 79
End: 2019-06-18

## 2019-06-18 NOTE — TELEPHONE ENCOUNTER
"Patient called stating \"do I need a sooner appointment, I have been having some slight chest pains, but I am going for EGD 6/26/19 because I have a hiatal hernia and ulcers\". Advised patient that she had negative stress test 3/2019 and was advised to follow with PCP for possible anxiety. Patient has follow up appointment 8/7/19.  Do you have any further recommendations? Thanks   "

## 2019-07-02 ENCOUNTER — TELEPHONE (OUTPATIENT)
Dept: CARDIOLOGY | Facility: CLINIC | Age: 79
End: 2019-07-02

## 2019-07-02 NOTE — TELEPHONE ENCOUNTER
Pt called asking to be seen in [place]. After reviewing her chart, I asking patient about her EGD, she reported that the GI doc found something wrong and wanted her to go to a doctor in [place] for more testing/ possible surgery. Advised her that according to Tracy's last telephone encounter she feels it is GI related and would want her to proceed with his recommendations. Advised her to discuss with her PCP if she has any questions since we are not aware of his findings or his plan.

## 2019-08-07 ENCOUNTER — OFFICE VISIT (OUTPATIENT)
Dept: CARDIOLOGY | Facility: CLINIC | Age: 79
End: 2019-08-07

## 2019-08-07 VITALS
SYSTOLIC BLOOD PRESSURE: 110 MMHG | BODY MASS INDEX: 25.6 KG/M2 | WEIGHT: 135.6 LBS | DIASTOLIC BLOOD PRESSURE: 78 MMHG | HEART RATE: 60 BPM | HEIGHT: 61 IN

## 2019-08-07 DIAGNOSIS — E78.00 HYPERCHOLESTEREMIA: ICD-10-CM

## 2019-08-07 DIAGNOSIS — R07.89 CHEST PAIN, ATYPICAL: ICD-10-CM

## 2019-08-07 DIAGNOSIS — I49.8 SINUS ARRHYTHMIA: ICD-10-CM

## 2019-08-07 DIAGNOSIS — K21.00 GASTROESOPHAGEAL REFLUX DISEASE WITH ESOPHAGITIS: ICD-10-CM

## 2019-08-07 DIAGNOSIS — I10 ESSENTIAL HYPERTENSION: ICD-10-CM

## 2019-08-07 DIAGNOSIS — I25.9 IHD (ISCHEMIC HEART DISEASE): Primary | ICD-10-CM

## 2019-08-07 PROCEDURE — 99214 OFFICE O/P EST MOD 30 MIN: CPT | Performed by: NURSE PRACTITIONER

## 2019-08-07 PROCEDURE — 93000 ELECTROCARDIOGRAM COMPLETE: CPT | Performed by: NURSE PRACTITIONER

## 2019-08-07 RX ORDER — RANOLAZINE 500 MG/1
500 TABLET, EXTENDED RELEASE ORAL 2 TIMES DAILY
Qty: 60 TABLET | Refills: 8 | Status: SHIPPED | OUTPATIENT
Start: 2019-08-07 | End: 2020-02-05 | Stop reason: ALTCHOICE

## 2019-08-07 RX ORDER — LISINOPRIL 5 MG/1
5 TABLET ORAL DAILY
Qty: 90 TABLET | Refills: 3 | Status: SHIPPED | OUTPATIENT
Start: 2019-08-07 | End: 2020-02-05 | Stop reason: SDUPTHER

## 2019-08-07 NOTE — PROGRESS NOTES
Chief Complaint   Patient presents with   • Follow-up     Cardiac management. She may have surgery in the near future concerning stomach.   • Chest Pain     Having pressure and tightness to left chest, pains are frequent. She reports that she has not took Nitro. She reports having problems now with stomach, also has hiatal hernia.   • Shortness of Breath     Has occasional, not often, she feels related to stress. She continues to walk about a mile a day with no difficulty breathing.   • Lab     She is to have labs at [facility].   • Med Refill     Needs refills on Lisinopril-90 day.       Subjective       Mago Gupta is an 79 y.o. female with HTN, hyperlipidemia, and IHD diagnosed in 2014 when she underwent stenting of the LAD in [place]. Details not available.  She was referred here for local cardiac care. Stress showed apical ischemia. Cath in 2/2017 revealed patent LAD stent with small distal vessel managed medically. L-arginine added. She returned 1/2018 with more CP. Stress repeated was negative. US of LUE for numbness and pain was negative. She has had irregular pulse noted to have sinus arrhythmia. She does have quite a bit of stress and worry, caring for her handicapped daughter.  She came today for follow up. Continues to have chest pain to upper left chest as well as epigastric pain. Recent EGD with Dr. Shukla revealed hiatal hernia with ulcer. She has test for gastric emptying next week. Chest pain is sometimes sharp and brief and sometimes dull and lasts several minutes. Labs are followed with Dr. Weldon. In 2017, lipids well controlled.     HPI         Cardiac History:    Past Surgical History:   Procedure Laterality Date   • CARDIAC CATHETERIZATION  08/18/2014    Stent in [place] (LAD)   • CARDIAC CATHETERIZATION  02/07/2017    Patent stent in LAD. Small Distal LAD and PDA   • CARDIOVASCULAR STRESS TEST  09/15/2014    @[facility]. CVA- 5 Min, 95% THR. Negative.   • CARDIOVASCULAR STRESS TEST   01/24/2017    6 Min, 85% THR. Apical ischemia.   • CARDIOVASCULAR STRESS TEST  01/08/2018    (Mod) 9 Min, 74% THR. BP- 142/86. Negative   • CARDIOVASCULAR STRESS TEST  03/21/2019    L.Cardiolite- EF 65%. Diaphramatic attenuation.   • ECHO - CONVERTED  01/24/2017    EF > 65%   • ECHO - CONVERTED  01/08/2018    EF > 65%   • OTHER SURGICAL HISTORY  09/10/2018    LUE artertial US:  Negative       Current Outpatient Medications   Medication Sig Dispense Refill   • Arginine 500 MG capsule Take  by mouth 2 (Two) Times a Day.     • Calcium Citrate-Vitamin D (CALCIUM + D PO) Take  by mouth Daily.     • clopidogrel (PLAVIX) 75 MG tablet Take 75 mg by mouth Daily.     • diltiaZEM CD (CARDIZEM CD) 240 MG 24 hr capsule Take 240 mg by mouth Daily.     • ezetimibe (ZETIA) 10 MG tablet Take 10 mg by mouth Daily.     • lisinopril (PRINIVIL,ZESTRIL) 5 MG tablet Take 1 tablet by mouth Daily. 90 tablet 3   • Multiple Vitamins-Minerals (MULTI COMPLETE PO) Take  by mouth.     • Multiple Vitamins-Minerals (PRESERVISION AREDS PO) Take  by mouth 2 (Two) Times a Day.     • nitroglycerin (NITROLINGUAL) 0.4 MG/SPRAY spray Place 1 spray under the tongue Every 5 (Five) Minutes As Needed for Chest Pain. 1 each 3   • pantoprazole (PROTONIX) 40 MG EC tablet Take 40 mg by mouth Daily.     • ranolazine (RANEXA) 500 MG 12 hr tablet Take 1 tablet by mouth 2 (Two) Times a Day. 60 tablet 8     No current facility-administered medications for this visit.      Celebrex [celecoxib]; Codeine; Penicillins; and Dexilant [dexlansoprazole]    Past Medical History:   Diagnosis Date   • Cataract     Removed bilaterally   • Coronary artery disease     s/p stenting in 2014   • GERD (gastroesophageal reflux disease)    • H/O total hysterectomy    • Hiatal hernia    • History of cholecystectomy    • History of eye surgery     multiple eye surgeries   • Hx of breast reduction, elective    • Hyperlipidemia    • Hypertension    • Rheumatic fever     When she was 11   •  "Valvular disease      Social History     Socioeconomic History   • Marital status:      Spouse name: Not on file   • Number of children: Not on file   • Years of education: Not on file   • Highest education level: Not on file   Tobacco Use   • Smoking status: Never Smoker   • Smokeless tobacco: Never Used   Substance and Sexual Activity   • Alcohol use: No   • Drug use: No     Family History   Problem Relation Age of Onset   • Heart attack Mother    • Diabetes Mother    • Cancer Mother    • Heart attack Father    • Heart attack Brother         CABG   • Diabetes Brother    • Heart attack Brother         CABG   • Heart attack Brother         CABG   • Heart attack Brother         CABG   • Heart disease Sister    • Diabetes Daughter    • Hypertension Daughter    • Hyperlipidemia Daughter    • Hypertension Daughter        Review of Systems   Constitution: Positive for weakness and malaise/fatigue. Negative for decreased appetite, weight gain and weight loss.   HENT: Negative.    Eyes: Negative.    Cardiovascular: Positive for chest pain and dyspnea on exertion. Negative for leg swelling, palpitations and syncope.   Respiratory: Negative for cough and shortness of breath.    Endocrine: Negative.    Hematologic/Lymphatic: Negative.    Skin: Negative.    Musculoskeletal: Negative for falls and myalgias.   Gastrointestinal: Positive for abdominal pain and heartburn. Negative for melena, nausea and vomiting.   Genitourinary: Negative for hematuria.   Neurological: Negative for dizziness.   Psychiatric/Behavioral: Negative for altered mental status. The patient is nervous/anxious (worries about her daughter).    Allergic/Immunologic: Negative.       Diabetes- No  Thyroid-normal    Objective     /78 (BP Location: Right arm)   Pulse 60   Ht 154.9 cm (60.98\")   Wt 61.5 kg (135 lb 9.6 oz)   BMI 25.63 kg/m²     Physical Exam   Constitutional: She is oriented to person, place, and time. She appears well-developed " and well-nourished. No distress.   HENT:   Head: Normocephalic.   Eyes: Pupils are equal, round, and reactive to light.   Neck: Normal range of motion. Neck supple.   Cardiovascular: Normal rate and intact distal pulses. An irregularly irregular rhythm present.   Pulmonary/Chest: Effort normal and breath sounds normal.   Abdominal: Soft. Bowel sounds are normal.   Neurological: She is alert and oriented to person, place, and time.   Skin: Skin is warm and dry. She is not diaphoretic.   Psychiatric: She has a normal mood and affect.   Nursing note and vitals reviewed.       ECG 12 Lead  Date/Time: 8/9/2019 11:35 AM  Performed by: Judie Bates APRN  Authorized by: Judie Bates APRN   Comparison: compared with previous ECG from 9/12/2018  Similar to previous ECG  Rhythm: sinus rhythm and sinus arrhythmia  Rate: normal  BPM: 71    Clinical impression: non-specific ECG  Comments: Sinus arrhythmia                 Assessment/Plan    Heart rate and blood pressure are normal. We reviewed previous cardiac cath and stress test showing patent LAD stent with small vessel disease distal to stent. Will try adding Ranexa 500 mg BID for chronic stable angina. Continue L-arginine. Some of her chest pain is likely GI etiology as her recent scope revealed hiatal hernia and ulcer. She is planning for more tests next week. EKG for irregular pulse again showed sinus arrhythmia, no atrial fib. Continue Cardizem.  Refills sent for lisinopril. Labs followed with Dr. Weldon. She appears stable from a cardiac standpoint. We will see her back in six months.   Mago was seen today for follow-up, chest pain, shortness of breath, lab and med refill.    Diagnoses and all orders for this visit:    IHD (ischemic heart disease)  -     ranolazine (RANEXA) 500 MG 12 hr tablet; Take 1 tablet by mouth 2 (Two) Times a Day.    Essential hypertension  -     lisinopril (PRINIVIL,ZESTRIL) 5 MG tablet; Take 1 tablet by mouth  Daily.    Hypercholesteremia    Gastroesophageal reflux disease with esophagitis    Chest pain, atypical  -     ranolazine (RANEXA) 500 MG 12 hr tablet; Take 1 tablet by mouth 2 (Two) Times a Day.        Patient's Body mass index is 25.63 kg/m². BMI is within normal parameters. No follow-up required..               Electronically signed by KIRILL Duarte,  August 9, 2019 11:32 AM

## 2019-08-09 PROBLEM — R07.89 CHEST PAIN, ATYPICAL: Status: ACTIVE | Noted: 2019-08-09

## 2019-09-23 ENCOUNTER — TELEPHONE (OUTPATIENT)
Dept: CARDIOLOGY | Facility: CLINIC | Age: 79
End: 2019-09-23

## 2019-09-23 NOTE — TELEPHONE ENCOUNTER
Received fax from Dr. Landen Pizano for cardiac clearance for patient to have a robot assist paraesophageal hiatal hernia repair. According to our records, patient's last stent was done on 08/18/14. Patient is on Plavix.       Fax [phone]

## 2019-11-19 ENCOUNTER — TELEPHONE (OUTPATIENT)
Dept: CARDIOLOGY | Facility: CLINIC | Age: 79
End: 2019-11-19

## 2019-11-19 DIAGNOSIS — I48.0 PAF (PAROXYSMAL ATRIAL FIBRILLATION) (HCC): Primary | ICD-10-CM

## 2019-11-19 NOTE — TELEPHONE ENCOUNTER
"Daughter Katie Gupta called stating \"mom had hiatal hernia surgery 10/28/19 in [place] and went home the next day, she was told she had Afib and that I need to contact you all to have her blood thinner changed\".     Daughter is unable to inform of what B/P and HR has been running.    Attempting to obtain records from [facility].  "

## 2019-11-19 NOTE — TELEPHONE ENCOUNTER
Appears she did have AF with RVR per EKG on 10/28/19. She has history of sinus arrhythmia in the past but we have not documented PAF.    Recommend stopping Plavix, add Xarelto 20 mg. Continue aspirin 81 mg.  HGB 13.3/38. Renal function was normal on last labs we have- 2017. Can we get labs from Dr. Weldon?    Can she come for holter, so we can see AF burden? Or we can mail to patient's house. May need to change Cardizem to sotalol to prevent reoccurrence. Order placed.

## 2019-11-20 NOTE — TELEPHONE ENCOUNTER
"Daughter Katie made aware of recommendations to stop plavix, add Xarelto 20mg daily, continue aspirin 81mg daily, need to have holter monitor placed so we can see AF burden, Katie verbalized understanding and states \"we can do the Xarelto but she can not tolerate Aspirin and has not been taking aspirin, we want the monitor mailed to patient's house.  "

## 2019-11-20 NOTE — TELEPHONE ENCOUNTER
Daughter Katie reports last labs at health fair at [facility], phoned [facility] spoke with Sarah concerning lab results. Labs to be sent to office.

## 2019-11-25 ENCOUNTER — TELEPHONE (OUTPATIENT)
Dept: CARDIOLOGY | Facility: CLINIC | Age: 79
End: 2019-11-25

## 2019-12-06 ENCOUNTER — OUTSIDE FACILITY SERVICE (OUTPATIENT)
Dept: CARDIOLOGY | Facility: CLINIC | Age: 79
End: 2019-12-06

## 2019-12-06 PROCEDURE — 93228 REMOTE 30 DAY ECG REV/REPORT: CPT | Performed by: INTERNAL MEDICINE

## 2019-12-16 ENCOUNTER — TELEPHONE (OUTPATIENT)
Dept: CARDIOLOGY | Facility: CLINIC | Age: 79
End: 2019-12-16

## 2019-12-16 NOTE — TELEPHONE ENCOUNTER
"Patient called asking if she needed sooner appointment due to having hernia surgery in October and she has some occasional tingling in right upper chest then will have occasional left upper chest tingle not often but is worried due to everyone in her family has  with heart attack.     She went to ER last month due to she thought she had blood clot in her leg, states \"everything was normal when in ER, no clot\".    Any further recommendations? Thanks   "

## 2019-12-16 NOTE — TELEPHONE ENCOUNTER
Stress test was normal in March 2019.     Did she respond well to Ranexa? If so, we can try increasing to 1000 mg BID.

## 2019-12-17 NOTE — TELEPHONE ENCOUNTER
"Patient made aware of recommendations, patient states \"I done well with the Ranexa but I don't want to increase to 1000mg bid so I will just see you all in 02/2020\", Judie ROY made aware.  "

## 2020-02-03 NOTE — PROGRESS NOTES
Chief Complaint   Patient presents with   • Follow-up     For cardiac management. Patient is not on aspirin. Is asking if she is taking her medications at the right time. Had hiatal hernia surgery and found her in afib, was started on Xarelto. States that she has mild chest pain every day. Medication list did not have Ranexa.    • Med Refill     Does not need refills on medications at this time. Brought medications with visit.        Cardiac Complaints  chest pressure/discomfort      Subjective   Mago Gupta is a 79 y.o. female with HTN, hyperlipidemia, chronic angina, and IHD diagnosed in 2014 when she underwent stenting of the LAD in [place]. Details not available.  She was referred here for local cardiac care. Stress showed apical ischemia. Cath in 2/2017 revealed patent LAD stent with small distal vessel managed medically. L-arginine added. She returned 1/2018 with more CP. Stress repeated was negative. US of LUE for numbness and pain was negative. She has had irregular pulse noted to have sinus arrhythmia. She does have quite a bit of stress and worry, caring for her handicapped daughter. Ranexa was added for chest pain management and she responded well. Most recent stress in 2019 was negative for ischemia and good LV function was noted.  She did have an incident of afib with RVR in the fall after she had hernia repair, records confirmed. Plavix discontinued per our office and xarelto added.     She does come today for follow up and does have some dull pain.  She admits this is almost daily but for reasons unknown she is not taking her ranexa therapy BID. She does report that she was noted to be in afib at surgery for a hernia repair and reports xarelto was started in regards, bleeding and bruising denied. Palpitations are denied. No syncope or dizziness reported.  Labs are followed by PCP, no current available. No refills needed.            Cardiac History  Past Surgical History:   Procedure Laterality  Date   • CARDIAC CATHETERIZATION  08/18/2014    Stent in [place] (LAD)   • CARDIAC CATHETERIZATION  02/07/2017    Patent stent in LAD. Small Distal LAD and PDA   • CARDIOVASCULAR STRESS TEST  09/15/2014    @[facility]. CVA- 5 Min, 95% THR. Negative.   • CARDIOVASCULAR STRESS TEST  01/24/2017    6 Min, 85% THR. Apical ischemia.   • CARDIOVASCULAR STRESS TEST  01/08/2018    (Mod) 9 Min, 74% THR. BP- 142/86. Negative   • CARDIOVASCULAR STRESS TEST  03/21/2019    L.Cardiolite- EF 65%. Diaphramatic attenuation.   • ECHO - CONVERTED  01/24/2017    EF > 65%   • ECHO - CONVERTED  01/08/2018    EF > 65%   • OTHER SURGICAL HISTORY  09/10/2018    LUE artertial US:  Negative       Current Outpatient Medications   Medication Sig Dispense Refill   • Arginine 500 MG capsule Take 1,000 mg by mouth Daily.     • Calcium Citrate-Vitamin D (CALCIUM + D PO) Take  by mouth Daily.     • diltiaZEM CD (CARDIZEM CD) 240 MG 24 hr capsule Take 240 mg by mouth Daily.     • ezetimibe (ZETIA) 10 MG tablet Take 10 mg by mouth Daily.     • Multiple Vitamins-Minerals (MULTI COMPLETE PO) Take  by mouth.     • Multiple Vitamins-Minerals (PRESERVISION AREDS PO) Take  by mouth 2 (Two) Times a Day.     • nitroglycerin (NITROLINGUAL) 0.4 MG/SPRAY spray Place 1 spray under the tongue Every 5 (Five) Minutes As Needed for Chest Pain. 1 each 3   • pantoprazole (PROTONIX) 40 MG EC tablet Take 40 mg by mouth Daily.     • rivaroxaban (XARELTO) 20 MG tablet Take 1 tablet by mouth Daily. 30 tablet 8   • lisinopril (PRINIVIL,ZESTRIL) 5 MG tablet Take 1 tablet by mouth Daily. 90 tablet 3   • ranolazine (RANEXA) 500 MG 12 hr tablet Take 1 tablet by mouth 2 (Two) Times a Day. 60 tablet 8     No current facility-administered medications for this visit.        Celebrex [celecoxib]; Codeine; Penicillins; and Dexilant [dexlansoprazole]    Past Medical History:   Diagnosis Date   • Cataract     Removed bilaterally   • Coronary artery disease     s/p stenting in 2014   • GERD  "(gastroesophageal reflux disease)    • H/O total hysterectomy    • Hiatal hernia    • History of cholecystectomy    • History of eye surgery     multiple eye surgeries   • Hx of breast reduction, elective    • Hyperlipidemia    • Hypertension    • Rheumatic fever     When she was 11   • Valvular disease        Social History     Socioeconomic History   • Marital status:      Spouse name: Not on file   • Number of children: Not on file   • Years of education: Not on file   • Highest education level: Not on file   Tobacco Use   • Smoking status: Never Smoker   • Smokeless tobacco: Never Used   Substance and Sexual Activity   • Alcohol use: No   • Drug use: No       Family History   Problem Relation Age of Onset   • Heart attack Mother    • Diabetes Mother    • Cancer Mother    • Heart attack Father    • Heart attack Brother         CABG   • Diabetes Brother    • Heart attack Brother         CABG   • Heart attack Brother         CABG   • Heart attack Brother         CABG   • Heart disease Sister    • Diabetes Daughter    • Hypertension Daughter    • Hyperlipidemia Daughter    • Hypertension Daughter        Review of Systems   Constitution: Negative for malaise/fatigue and night sweats.   Cardiovascular: Positive for chest pain. Negative for dyspnea on exertion, irregular heartbeat, leg swelling, near-syncope, palpitations and syncope.   Respiratory: Negative for cough, shortness of breath and wheezing.    Musculoskeletal: Positive for joint pain and stiffness. Negative for back pain.   Gastrointestinal: Positive for anorexia. Negative for heartburn and vomiting.   Neurological: Negative for dizziness, light-headedness and loss of balance.   Psychiatric/Behavioral: Negative for depression. The patient is nervous/anxious.            Objective     /80 (BP Location: Left arm)   Pulse 64   Ht 154.9 cm (60.98\")   Wt 59.4 kg (131 lb)   BMI 24.77 kg/m²     Physical Exam   Constitutional: She is oriented to " person, place, and time. She appears well-developed and well-nourished.   HENT:   Head: Normocephalic and atraumatic.   Eyes: Pupils are equal, round, and reactive to light. EOM are normal.   Neck: Normal range of motion. Neck supple.   Cardiovascular: Normal rate and regular rhythm.   Murmur heard.  Pulmonary/Chest: Effort normal and breath sounds normal.   Abdominal: Soft.   Musculoskeletal: Normal range of motion.   Neurological: She is alert and oriented to person, place, and time.   Skin: Skin is warm and dry.   Psychiatric: She has a normal mood and affect. Her behavior is normal.         ECG 12 Lead  Date/Time: 2/5/2020 12:04 PM  Performed by: Tracy Thomas APRN  Authorized by: Tracy Thomas APRN   Comparison: compared with previous ECG from 10/28/2019  Similar to previous ECG  Rhythm: sinus rhythm  BPM: 64  Conduction: 1st degree AV block    Clinical impression: abnormal EKG  Comments: Normal QT            Assessment/Plan     PAF:  EKG done today in regards shows a NSR with normal QT and first degree block. Rate controlled with cardizem therapy.  She is anticoagulated with xarelto therapy and tolerates well. Bleeding and bruising denied. Same continued.    Chronic angina:  She is currently no on ranexa and she is unsure why she isn't she will be urged to add back 500mg BID as she does report  dull ache. L.arginine also continued.    HTN:  Blood pressure well managed on current. Same lisinopril dosing continued.  Refills sent.    Hyperlipidemia:  She remains zetia. She tolerates well. No recent FLP available but she reports you are following.  Can we have next for review?  For now, zetia continued.    Hiatal hernia: Repaired in fall 2019, patient has felt much better since. She has been released by surgery.    BMI noted around 24, weight has steadily decreased. She reports lack of appetite but was urged to increase protein intake and to supplement her diet with boost/ensure.    6 month follow up  recommended or sooner if needed.          Problems Addressed this Visit        Cardiovascular and Mediastinum    Essential hypertension    Relevant Medications    lisinopril (PRINIVIL,ZESTRIL) 5 MG tablet    Hypercholesteremia    IHD (ischemic heart disease) - Primary    Relevant Medications    ranolazine (RANEXA) 500 MG 12 hr tablet    nitroglycerin (NITROLINGUAL) 0.4 MG/SPRAY spray    Murmur, cardiac    Small vessel disease (CMS/HCC)       Nervous and Auditory    Chest pain, atypical      Other Visit Diagnoses     PAF (paroxysmal atrial fibrillation) (CMS/HCC)        Relevant Medications    ranolazine (RANEXA) 500 MG 12 hr tablet    nitroglycerin (NITROLINGUAL) 0.4 MG/SPRAY spray    Other Relevant Orders    ECG 12 Lead    Chronic stable angina (CMS/HCC)        Relevant Medications    ranolazine (RANEXA) 500 MG 12 hr tablet    nitroglycerin (NITROLINGUAL) 0.4 MG/SPRAY spray    Long term (current) use of anticoagulants        Hiatal hernia              Patient's Body mass index is 24.77 kg/m². BMI is within normal parameters. No follow-up required..                   Electronically signed by KIRILL Brooks February 5, 2020 4:46 PM

## 2020-02-04 PROBLEM — I25.118 ATHEROSCLEROTIC HEART DISEASE OF NATIVE CORONARY ARTERY WITH OTHER FORMS OF ANGINA PECTORIS (HCC): Status: RESOLVED | Noted: 2020-02-04 | Resolved: 2020-02-04

## 2020-02-04 PROBLEM — I73.9 SMALL VESSEL DISEASE: Status: ACTIVE | Noted: 2020-02-04

## 2020-02-04 PROBLEM — I25.118 ATHEROSCLEROTIC HEART DISEASE OF NATIVE CORONARY ARTERY WITH OTHER FORMS OF ANGINA PECTORIS (HCC): Status: ACTIVE | Noted: 2020-02-04

## 2020-02-05 ENCOUNTER — OFFICE VISIT (OUTPATIENT)
Dept: CARDIOLOGY | Facility: CLINIC | Age: 80
End: 2020-02-05

## 2020-02-05 VITALS
DIASTOLIC BLOOD PRESSURE: 80 MMHG | BODY MASS INDEX: 24.73 KG/M2 | WEIGHT: 131 LBS | HEIGHT: 61 IN | SYSTOLIC BLOOD PRESSURE: 130 MMHG | HEART RATE: 64 BPM

## 2020-02-05 DIAGNOSIS — K44.9 HIATAL HERNIA: ICD-10-CM

## 2020-02-05 DIAGNOSIS — R01.1 MURMUR, CARDIAC: ICD-10-CM

## 2020-02-05 DIAGNOSIS — Z79.01 LONG TERM (CURRENT) USE OF ANTICOAGULANTS: ICD-10-CM

## 2020-02-05 DIAGNOSIS — I25.9 IHD (ISCHEMIC HEART DISEASE): Primary | ICD-10-CM

## 2020-02-05 DIAGNOSIS — R07.89 CHEST PAIN, ATYPICAL: ICD-10-CM

## 2020-02-05 DIAGNOSIS — I10 ESSENTIAL HYPERTENSION: ICD-10-CM

## 2020-02-05 DIAGNOSIS — E78.00 HYPERCHOLESTEREMIA: ICD-10-CM

## 2020-02-05 DIAGNOSIS — I73.9 SMALL VESSEL DISEASE (HCC): ICD-10-CM

## 2020-02-05 DIAGNOSIS — I20.8 CHRONIC STABLE ANGINA (HCC): ICD-10-CM

## 2020-02-05 DIAGNOSIS — I48.0 PAF (PAROXYSMAL ATRIAL FIBRILLATION) (HCC): ICD-10-CM

## 2020-02-05 PROCEDURE — 99214 OFFICE O/P EST MOD 30 MIN: CPT | Performed by: NURSE PRACTITIONER

## 2020-02-05 PROCEDURE — 93000 ELECTROCARDIOGRAM COMPLETE: CPT | Performed by: NURSE PRACTITIONER

## 2020-02-05 RX ORDER — RANOLAZINE 500 MG/1
500 TABLET, EXTENDED RELEASE ORAL 2 TIMES DAILY
Qty: 60 TABLET | Refills: 8 | Status: SHIPPED | OUTPATIENT
Start: 2020-02-05 | End: 2020-09-21 | Stop reason: ALTCHOICE

## 2020-02-05 RX ORDER — NITROGLYCERIN 400 UG/1
1 SPRAY ORAL
Qty: 1 EACH | Refills: 3 | Status: SHIPPED | OUTPATIENT
Start: 2020-02-05 | End: 2020-09-21 | Stop reason: SDUPTHER

## 2020-02-05 RX ORDER — LISINOPRIL 5 MG/1
5 TABLET ORAL DAILY
Qty: 90 TABLET | Refills: 3 | Status: SHIPPED | OUTPATIENT
Start: 2020-02-05 | End: 2020-09-21 | Stop reason: SDUPTHER

## 2020-02-13 ENCOUNTER — TELEPHONE (OUTPATIENT)
Dept: CARDIOLOGY | Facility: CLINIC | Age: 80
End: 2020-02-13

## 2020-02-13 NOTE — TELEPHONE ENCOUNTER
Patient called stating that since starting the Ranexa 500 mg BID that she has been having pain in her legs and has not been able to sleep at night due to the pain. She states that her mouth has also been very dry and has been drinking a lot of water since starting. She denies having any chest pain. Patient would like to know if there is anything recommendations that could help?

## 2020-02-14 NOTE — TELEPHONE ENCOUNTER
Patient was made aware to see PCP for CMP to check sodium level. She was also made aware that if problems persist with Ranexa, that she could go off of medication.       I spoke with Luisa at PCP office and he states that they can put order in. Patient made aware.

## 2020-02-14 NOTE — TELEPHONE ENCOUNTER
My need CMP to check sodium.  Go to PCP have them check our lab is closed. If problems persist, d/c.

## 2020-06-18 ENCOUNTER — TELEPHONE (OUTPATIENT)
Dept: CARDIOLOGY | Facility: CLINIC | Age: 80
End: 2020-06-18

## 2020-06-18 RX ORDER — ISOSORBIDE MONONITRATE 30 MG/1
30 TABLET, EXTENDED RELEASE ORAL EVERY MORNING
Qty: 30 TABLET | Refills: 11 | Status: SHIPPED | OUTPATIENT
Start: 2020-06-18 | End: 2020-09-21 | Stop reason: ALTCHOICE

## 2020-06-18 NOTE — TELEPHONE ENCOUNTER
Patient was made aware that she can stop Ranexa and can try imdur 30 mg daily. Script sent to [facility] in [place] with 30 tablets and 11 refills.

## 2020-06-18 NOTE — TELEPHONE ENCOUNTER
Patient called stating that she has been having more problems with taking Ranexa 500 mg BID. She states that a doctor, unsure if it was PCP, decreased dose to once a day. She states that she has an upset stomach, feels light headed, has a sore mouth and can't eat due to sore mouth.     She was made aware that per telephone encounter on 02/13/2020, that Tracy had said that if patient continued to have problems with Ranexa for patient to discontinue. Patient was made aware that she could discontinue.    Patient was asking if there is any thing else she could take in place of Ranexa?      Patient is on:  · Xarelto 20 mg daily  · Lisinopril 5 mg daily  · zetia 10 mg daily  · Arginine 1000 mg daily  · Diltiazem  mg daily    Patient states that she has not had chest pain, but some soreness. She states that she feels tired all the time. Did not have BP readings.

## 2020-06-25 ENCOUNTER — TELEPHONE (OUTPATIENT)
Dept: CARDIOLOGY | Facility: CLINIC | Age: 80
End: 2020-06-25

## 2020-06-25 NOTE — TELEPHONE ENCOUNTER
Pt called, she was unable to take Ranexa due to side effects, Imdur 30 mg daily started ( see previous telephone encounter). Pt reports not being able to take it either. Nausea, headache, severely lathergic for 24 hours after taking. States she is doing good, denies any chest pain, walks a mile every day. She had rather not even try another medication unless you feel absolutely necessary.

## 2020-07-30 ENCOUNTER — TELEPHONE (OUTPATIENT)
Dept: CARDIOLOGY | Facility: CLINIC | Age: 80
End: 2020-07-30

## 2020-07-30 NOTE — TELEPHONE ENCOUNTER
Patients daughter Katie called to report that patient has a compression fracture and was given Hydrocodone/acetametaphin. Asking if ok to take?    Per Tracy it is fine to take with other medications.   Patients daughter aware.

## 2020-08-06 ENCOUNTER — TELEPHONE (OUTPATIENT)
Dept: CARDIOLOGY | Facility: CLINIC | Age: 80
End: 2020-08-06

## 2020-08-06 NOTE — TELEPHONE ENCOUNTER
Received fax from Dr. Sigifredo Hughes for cardiac clearance for patient to have a T12 kyphoplasty. Patient is on Xarelto, unclear if they are needing to hold. According to our records, patient's last stenting was done on 08/18/14. Patient has a history of PAF.       Fax [phone]

## 2020-09-21 ENCOUNTER — OFFICE VISIT (OUTPATIENT)
Dept: CARDIOLOGY | Facility: CLINIC | Age: 80
End: 2020-09-21

## 2020-09-21 VITALS
HEIGHT: 61 IN | HEART RATE: 81 BPM | SYSTOLIC BLOOD PRESSURE: 110 MMHG | TEMPERATURE: 98.4 F | BODY MASS INDEX: 23.03 KG/M2 | DIASTOLIC BLOOD PRESSURE: 70 MMHG | WEIGHT: 122 LBS

## 2020-09-21 DIAGNOSIS — I20.8 CHRONIC STABLE ANGINA (HCC): ICD-10-CM

## 2020-09-21 DIAGNOSIS — I10 ESSENTIAL HYPERTENSION: ICD-10-CM

## 2020-09-21 DIAGNOSIS — I48.0 PAF (PAROXYSMAL ATRIAL FIBRILLATION) (HCC): Primary | ICD-10-CM

## 2020-09-21 DIAGNOSIS — E78.00 HYPERCHOLESTEREMIA: ICD-10-CM

## 2020-09-21 DIAGNOSIS — I73.9 SMALL VESSEL DISEASE (HCC): ICD-10-CM

## 2020-09-21 DIAGNOSIS — R01.1 MURMUR, CARDIAC: ICD-10-CM

## 2020-09-21 DIAGNOSIS — I25.9 IHD (ISCHEMIC HEART DISEASE): ICD-10-CM

## 2020-09-21 PROCEDURE — 99214 OFFICE O/P EST MOD 30 MIN: CPT | Performed by: NURSE PRACTITIONER

## 2020-09-21 PROCEDURE — 93000 ELECTROCARDIOGRAM COMPLETE: CPT | Performed by: NURSE PRACTITIONER

## 2020-09-21 RX ORDER — NITROGLYCERIN 400 UG/1
1 SPRAY ORAL
Qty: 1 EACH | Refills: 3 | Status: SHIPPED | OUTPATIENT
Start: 2020-09-21

## 2020-09-21 RX ORDER — ACETAMINOPHEN 500 MG
500 TABLET ORAL EVERY 6 HOURS PRN
COMMUNITY

## 2020-09-21 RX ORDER — EZETIMIBE 10 MG/1
10 TABLET ORAL DAILY
Qty: 30 TABLET | Refills: 8 | Status: SHIPPED | OUTPATIENT
Start: 2020-09-21 | End: 2021-03-24 | Stop reason: SDUPTHER

## 2020-09-21 RX ORDER — LISINOPRIL 5 MG/1
5 TABLET ORAL DAILY
Qty: 30 TABLET | Refills: 8 | Status: SHIPPED | OUTPATIENT
Start: 2020-09-21 | End: 2021-03-24 | Stop reason: SDUPTHER

## 2020-09-21 RX ORDER — DILTIAZEM HYDROCHLORIDE 240 MG/1
240 CAPSULE, COATED, EXTENDED RELEASE ORAL DAILY
Qty: 30 CAPSULE | Refills: 8 | Status: SHIPPED | OUTPATIENT
Start: 2020-09-21 | End: 2021-03-24 | Stop reason: SDUPTHER

## 2020-09-21 NOTE — PROGRESS NOTES
Chief Complaint   Patient presents with   • Follow-up     For cardiac management. Patient is not on aspirin. Last lab work was done last month before surgery on back, not in chart. States that she has had some chest pain, states that sometimes she feels a tingling feeling in her left side of chest of chest when she lays down.    • Med Refill     Needs refills on cardiac medications. 30 day supplies to [facility] in [place]. Brought medication list with visit.        Cardiac Complaints  chest pressure/discomfort      Subjective   Mago Gupta is a 80 y.o. female with HTN, hyperlipidemia, chronic angina, PAF, and IHD diagnosed in 2014 when she underwent stenting of the LAD in [place]. Details not available.  She was referred here for local cardiac care. Stress showed apical ischemia. Cath in 2/2017 revealed patent LAD stent with small distal vessel managed medically. L-arginine added. She returned 1/2018 with more CP. Stress repeated was negative. US of LUE for numbness and pain was negative. She has had irregular pulse noted to have sinus arrhythmia. She does have quite a bit of stress and worry, caring for her handicapped daughter. Ranexa was added for chest pain management and she responded well. Most recent stress in 2019 was negative for ischemia and good LV function was noted.  She did have an incident of afib with RVR in the fall after she had hernia repair, records confirmed. Plavix discontinued per our office and xarelto added.      Patient returns today for follow up and denies any new concerns. Dull chest pain and tingling in the left chest area continues but no worse than prior. She denies any exertional pain. She has not been using NTG in regards. She denies any bleeding or bruising.  Labs she admits were done before surgery for kyphoplasty in September, none available.  She does report recovering from back surgery but is still having pain in regards but it has slightly improved.  Refills  requested. Bleeding and bruising denied.       Cardiac History  Past Surgical History:   Procedure Laterality Date   • CARDIAC CATHETERIZATION  08/18/2014    Stent in [place] (LAD)   • CARDIAC CATHETERIZATION  02/07/2017    Patent stent in LAD. Small Distal LAD and PDA   • CARDIOVASCULAR STRESS TEST  09/15/2014    @[facility]. CVA- 5 Min, 95% THR. Negative.   • CARDIOVASCULAR STRESS TEST  01/24/2017    6 Min, 85% THR. Apical ischemia.   • CARDIOVASCULAR STRESS TEST  01/08/2018    (Mod) 9 Min, 74% THR. BP- 142/86. Negative   • CARDIOVASCULAR STRESS TEST  03/21/2019    L.Cardiolite- EF 65%. Diaphramatic attenuation.   • ECHO - CONVERTED  01/24/2017    EF > 65%   • ECHO - CONVERTED  01/08/2018    EF > 65%   • OTHER SURGICAL HISTORY  09/10/2018    LUE artertial US:  Negative       Current Outpatient Medications   Medication Sig Dispense Refill   • acetaminophen (TYLENOL) 500 MG tablet Take 500 mg by mouth Every 6 (Six) Hours As Needed for Mild Pain .     • Arginine 500 MG capsule Take 1,000 mg by mouth Daily.     • Calcium Citrate-Vitamin D (CALCIUM + D PO) Take  by mouth Daily.     • dilTIAZem CD (CARDIZEM CD) 240 MG 24 hr capsule Take 1 capsule by mouth Daily. 30 capsule 8   • ezetimibe (Zetia) 10 MG tablet Take 1 tablet by mouth Daily. 30 tablet 8   • lisinopril (PRINIVIL,ZESTRIL) 5 MG tablet Take 1 tablet by mouth Daily. 30 tablet 8   • Multiple Vitamins-Minerals (MULTI COMPLETE PO) Take  by mouth.     • Multiple Vitamins-Minerals (PRESERVISION AREDS PO) Take  by mouth 2 (Two) Times a Day.     • nitroglycerin (NITROLINGUAL) 0.4 MG/SPRAY spray Place 1 spray under the tongue Every 5 (Five) Minutes As Needed for Chest Pain. 1 each 3   • pantoprazole (PROTONIX) 40 MG EC tablet Take 40 mg by mouth Daily.     • rivaroxaban (XARELTO) 20 MG tablet Take 1 tablet by mouth Daily. 30 tablet 8     No current facility-administered medications for this visit.        Celebrex [celecoxib], Codeine, Penicillins, and Dexilant  [dexlansoprazole]    Past Medical History:   Diagnosis Date   • Cataract     Removed bilaterally   • Coronary artery disease     s/p stenting in 2014   • GERD (gastroesophageal reflux disease)    • H/O total hysterectomy    • Hiatal hernia    • History of cholecystectomy    • History of eye surgery     multiple eye surgeries   • Hx of breast reduction, elective    • Hyperlipidemia    • Hypertension    • Rheumatic fever     When she was 11   • Valvular disease        Social History     Socioeconomic History   • Marital status:      Spouse name: Not on file   • Number of children: Not on file   • Years of education: Not on file   • Highest education level: Not on file   Tobacco Use   • Smoking status: Never Smoker   • Smokeless tobacco: Never Used   Substance and Sexual Activity   • Alcohol use: No   • Drug use: No       Family History   Problem Relation Age of Onset   • Heart attack Mother    • Diabetes Mother    • Cancer Mother    • Heart attack Father    • Heart attack Brother         CABG   • Diabetes Brother    • Heart attack Brother         CABG   • Heart attack Brother         CABG   • Heart attack Brother         CABG   • Heart disease Sister    • Diabetes Daughter    • Hypertension Daughter    • Hyperlipidemia Daughter    • Hypertension Daughter        Review of Systems   Constitution: Negative for malaise/fatigue and night sweats.   Cardiovascular: Positive for chest pain. Negative for claudication, dyspnea on exertion, irregular heartbeat, leg swelling, near-syncope, orthopnea, palpitations and syncope.   Respiratory: Negative for cough, shortness of breath and wheezing.    Musculoskeletal: Positive for back pain and stiffness.   Gastrointestinal: Negative for anorexia, heartburn, melena, nausea and vomiting.   Genitourinary: Negative for dysuria, hematuria, hesitancy and nocturia.   Neurological: Negative for dizziness, light-headedness and loss of balance.   Psychiatric/Behavioral: Negative for  "depression and memory loss. The patient is nervous/anxious.            Objective     /70 (BP Location: Right arm)   Pulse 81   Temp 98.4 °F (36.9 °C)   Ht 154.9 cm (60.98\")   Wt 55.3 kg (122 lb)   BMI 23.06 kg/m²     Constitutional:       Appearance: Healthy appearance.   Eyes:      Pupils: Pupils are equal, round, and reactive to light.   HENT:    Mouth/Throat:      Pharynx: Oropharynx is clear.   Neck:      Musculoskeletal: Normal range of motion and neck supple.   Pulmonary:      Effort: Pulmonary effort is normal.   Cardiovascular:      PMI at left midclavicular line. Normal rate. Regular rhythm.      Murmurs: There is a high frequency blowing holosystolic murmur at the apex.   Abdominal:      General: Bowel sounds are normal.   Musculoskeletal: Normal range of motion.   Skin:     General: Skin is warm and dry.   Neurological:      Mental Status: Oriented to person, place and time.           ECG 12 Lead    Date/Time: 9/21/2020 1:44 PM  Performed by: Tracy Thomas APRN  Authorized by: Tracy Thomas APRN   Comparison: compared with previous ECG from 3/10/2020  Similar to previous ECG  Rhythm: sinus rhythm  BPM: 81  Conduction: 1st degree AV block    Clinical impression: abnormal EKG  Comments: Normal QT            Assessment/Plan     PAF:  EKG done today in regards shows a NSR with normal QT and first degree block. Rate controlled with cardizem therapy.  She is anticoagulated with xarelto therapy and tolerates well. Bleeding and bruising denied. Same continued.     Chronic angina:  Still having pain at night while resting but no worse than before. No repeat workup recommended. She will continue on L.arginine therapy.  Most recent stress in 2019 negative for ischemia. NTG spray continued as needed.      HTN:  Blood pressure well managed on current. Same lisinopril dosing continued.  Refills sent.     Hyperlipidemia:  She remains zetia. She tolerates well. No recent FLP available but she reports " you are following.  Can we have next for review?  For now, zetia continued.      BMI noted around 23.06, weight has steadily decreased. Once again discussed adequate protein and caloric intake     6 month follow up recommended or sooner if needed.    Refills per request.         Problems Addressed this Visit        Cardiovascular and Mediastinum    Essential hypertension    Relevant Medications    lisinopril (PRINIVIL,ZESTRIL) 5 MG tablet    dilTIAZem CD (CARDIZEM CD) 240 MG 24 hr capsule    Hypercholesteremia    Relevant Medications    ezetimibe (Zetia) 10 MG tablet    IHD (ischemic heart disease)    Relevant Medications    dilTIAZem CD (CARDIZEM CD) 240 MG 24 hr capsule    nitroglycerin (NITROLINGUAL) 0.4 MG/SPRAY spray    Murmur, cardiac    Small vessel disease (CMS/HCC)      Other Visit Diagnoses     PAF (paroxysmal atrial fibrillation) (CMS/HCC)    -  Primary    Relevant Medications    dilTIAZem CD (CARDIZEM CD) 240 MG 24 hr capsule    nitroglycerin (NITROLINGUAL) 0.4 MG/SPRAY spray    Other Relevant Orders    ECG 12 Lead    Chronic stable angina (CMS/HCC)        Relevant Medications    dilTIAZem CD (CARDIZEM CD) 240 MG 24 hr capsule    nitroglycerin (NITROLINGUAL) 0.4 MG/SPRAY spray          Patient's Body mass index is 23.06 kg/m². BMI is within normal parameters. No follow-up required..              Electronically signed by KIRILL Brooks September 21, 2020 16:10 EDT

## 2021-03-24 ENCOUNTER — OFFICE VISIT (OUTPATIENT)
Dept: CARDIOLOGY | Facility: CLINIC | Age: 81
End: 2021-03-24

## 2021-03-24 VITALS
HEART RATE: 80 BPM | DIASTOLIC BLOOD PRESSURE: 82 MMHG | BODY MASS INDEX: 23.49 KG/M2 | SYSTOLIC BLOOD PRESSURE: 100 MMHG | WEIGHT: 124.4 LBS | HEIGHT: 61 IN | TEMPERATURE: 97.1 F

## 2021-03-24 DIAGNOSIS — I25.9 IHD (ISCHEMIC HEART DISEASE): Primary | ICD-10-CM

## 2021-03-24 DIAGNOSIS — R07.89 CHEST PAIN, ATYPICAL: ICD-10-CM

## 2021-03-24 DIAGNOSIS — I73.9 SMALL VESSEL DISEASE (HCC): ICD-10-CM

## 2021-03-24 DIAGNOSIS — I10 ESSENTIAL HYPERTENSION: ICD-10-CM

## 2021-03-24 DIAGNOSIS — E78.00 HYPERCHOLESTEREMIA: ICD-10-CM

## 2021-03-24 PROCEDURE — 99214 OFFICE O/P EST MOD 30 MIN: CPT | Performed by: NURSE PRACTITIONER

## 2021-03-24 RX ORDER — EZETIMIBE 10 MG/1
10 TABLET ORAL DAILY
Qty: 30 TABLET | Refills: 8 | Status: SHIPPED | OUTPATIENT
Start: 2021-03-24 | End: 2022-02-14

## 2021-03-24 RX ORDER — CEPHRADINE 500 MG
1000 CAPSULE ORAL 2 TIMES DAILY
Qty: 180 EACH | Refills: 2
Start: 2021-03-24

## 2021-03-24 RX ORDER — LISINOPRIL 5 MG/1
5 TABLET ORAL DAILY
Qty: 30 TABLET | Refills: 8 | Status: SHIPPED | OUTPATIENT
Start: 2021-03-24 | End: 2022-03-14

## 2021-03-24 RX ORDER — DILTIAZEM HYDROCHLORIDE 240 MG/1
240 CAPSULE, COATED, EXTENDED RELEASE ORAL DAILY
Qty: 30 CAPSULE | Refills: 8 | Status: SHIPPED | OUTPATIENT
Start: 2021-03-24 | End: 2022-01-31

## 2021-03-24 NOTE — PROGRESS NOTES
Chief Complaint   Patient presents with   • Follow-up     cardiac management. pt is not in daily aspirin.   • Med Refill     will need cardiac meds refilled 90 days to [facility] in [place]. Brought med list with visit.   • Labs     last week at [facility] with ER visit. araceli obtain results.   • ER visist     last week at [facility], went in for chest pains. went down left shoulder. reports did not take breath away.. will obtain report.   • Chest Pain     4-5x a day every day. does not subside with rest.       Cardiac Complaints  chest pressure/discomfort      Subjective   Mago Gupta is a 80 y.o. female with HTN, hyperlipidemia, chronic angina, PAF, and IHD diagnosed in 2014 when she underwent stenting of the LAD in [place]. Details not available.  She was referred here for local cardiac care. Stress showed apical ischemia. Cath in 2/2017 revealed patent LAD stent with small distal vessel managed medically. L-arginine added. She returned 1/2018 with more CP. Stress repeated was negative. US of LUE for numbness and pain was negative. She has had irregular pulse noted to have sinus arrhythmia. She does have quite a bit of stress and worry, caring for her handicapped daughter. Ranexa was added for chest pain management and she responded well. Most recent stress in 2019 was negative for ischemia and good LV function was noted.  She did have an incident of afib with RVR in the fall after she had hernia repair, records confirmed. Plavix discontinued per our office and xarelto added.        Patient returns today for follow up and continues to have chest pain, 4-5 times daily. She does report a recent incident where she went to ER with chest pain that radiated down the left arm, nothing that took her breath away, patient stated the pain came at rest, cardiac workup negative. She has not been using NTG in regards. She states this happens about 4-5 times daily. She denies any bleeding or bruising with xarelto therapy.      Cardiac  History  Past Surgical History:   Procedure Laterality Date   • CARDIAC CATHETERIZATION  08/18/2014    Stent in [place] (LAD)   • CARDIAC CATHETERIZATION  02/07/2017    Patent stent in LAD. Small Distal LAD and PDA   • CARDIOVASCULAR STRESS TEST  09/15/2014    @[facility]. CVA- 5 Min, 95% THR. Negative.   • CARDIOVASCULAR STRESS TEST  01/24/2017    6 Min, 85% THR. Apical ischemia.   • CARDIOVASCULAR STRESS TEST  01/08/2018    (Mod) 9 Min, 74% THR. BP- 142/86. Negative   • CARDIOVASCULAR STRESS TEST  03/21/2019    L.Cardiolite- EF 65%. Diaphramatic attenuation.   • ECHO - CONVERTED  01/24/2017    EF > 65%   • ECHO - CONVERTED  01/08/2018    EF > 65%   • OTHER SURGICAL HISTORY  09/10/2018    LUE artertial US:  Negative       Current Outpatient Medications   Medication Sig Dispense Refill   • acetaminophen (TYLENOL) 500 MG tablet Take 500 mg by mouth Every 6 (Six) Hours As Needed for Mild Pain .     • Calcium Citrate-Vitamin D (CALCIUM + D PO) Take  by mouth Daily.     • dilTIAZem CD (CARDIZEM CD) 240 MG 24 hr capsule Take 1 capsule by mouth Daily. 30 capsule 8   • ezetimibe (Zetia) 10 MG tablet Take 1 tablet by mouth Daily. 30 tablet 8   • lisinopril (PRINIVIL,ZESTRIL) 5 MG tablet Take 1 tablet by mouth Daily. 30 tablet 8   • Multiple Vitamins-Minerals (MULTI COMPLETE PO) Take  by mouth.     • Multiple Vitamins-Minerals (PRESERVISION AREDS PO) Take  by mouth 2 (Two) Times a Day.     • nitroglycerin (NITROLINGUAL) 0.4 MG/SPRAY spray Place 1 spray under the tongue Every 5 (Five) Minutes As Needed for Chest Pain. 1 each 3   • pantoprazole (PROTONIX) 40 MG EC tablet Take 40 mg by mouth Daily.     • Polyethylene Glycol 3350 (MIRALAX PO) Take  by mouth Daily.     • rivaroxaban (XARELTO) 20 MG tablet Take 1 tablet by mouth Daily. 30 tablet 8   • L-Arginine 1000 MG tablet Take 1,000 mg by mouth 2 (two) times a day. 180 each 2     No current facility-administered medications for this visit.       Celebrex [celecoxib], Codeine,  Penicillins, and Dexilant [dexlansoprazole]    Past Medical History:   Diagnosis Date   • Cataract     Removed bilaterally   • Coronary artery disease     s/p stenting in 2014   • GERD (gastroesophageal reflux disease)    • H/O total hysterectomy    • Hiatal hernia    • History of cholecystectomy    • History of eye surgery     multiple eye surgeries   • Hx of breast reduction, elective    • Hyperlipidemia    • Hypertension    • Rheumatic fever     When she was 11   • Valvular disease        Social History     Socioeconomic History   • Marital status:      Spouse name: Not on file   • Number of children: Not on file   • Years of education: Not on file   • Highest education level: Not on file   Tobacco Use   • Smoking status: Never Smoker   • Smokeless tobacco: Never Used   Vaping Use   • Vaping Use: Never used   Substance and Sexual Activity   • Alcohol use: No   • Drug use: No       Family History   Problem Relation Age of Onset   • Heart attack Mother    • Diabetes Mother    • Cancer Mother    • Heart attack Father    • Heart attack Brother         CABG   • Diabetes Brother    • Heart attack Brother         CABG   • Heart attack Brother         CABG   • Heart attack Brother         CABG   • Heart disease Sister    • Diabetes Daughter    • Hypertension Daughter    • Hyperlipidemia Daughter    • Hypertension Daughter        Review of Systems   Constitutional: Negative for malaise/fatigue and night sweats.   Cardiovascular: Positive for chest pain. Negative for claudication, dyspnea on exertion, irregular heartbeat, leg swelling, near-syncope, orthopnea, palpitations and syncope.   Respiratory: Negative for cough, shortness of breath and wheezing.    Musculoskeletal: Positive for stiffness. Negative for back pain, joint pain and joint swelling.   Gastrointestinal: Negative for anorexia, heartburn, melena, nausea and vomiting.   Genitourinary: Negative for dysuria, hematuria, hesitancy and nocturia.  "  Neurological: Negative for dizziness, light-headedness and loss of balance.   Psychiatric/Behavioral: Negative for depression and memory loss. The patient is not nervous/anxious.            Objective     /82 (BP Location: Left arm)   Pulse 80   Temp 97.1 °F (36.2 °C)   Ht 154.9 cm (60.98\")   Wt 56.4 kg (124 lb 6.4 oz)   BMI 23.52 kg/m²     Constitutional:       Appearance: Healthy appearance. Not in distress.   Eyes:      Pupils: Pupils are equal, round, and reactive to light.   HENT:      Nose: Nose normal.   Pulmonary:      Effort: Pulmonary effort is normal.      Breath sounds: Normal breath sounds.   Cardiovascular:      PMI at left midclavicular line. Normal rate. Regular rhythm.      Murmurs: There is a systolic murmur.   Abdominal:      Palpations: Abdomen is soft.   Musculoskeletal: Normal range of motion.      Cervical back: Normal range of motion and neck supple. Skin:     General: Skin is warm and dry.   Neurological:      Mental Status: Oriented to person, place and time.         Procedures    Assessment/Plan     PAF:  EKG not done today due to multiple family members in the room. EKG from [facility] 03/15/2021 showed NSR. Rhythm remains regular. Rate controlled with cardizem therapy.  She is anticoagulated with xarelto therapy and tolerates well. Bleeding and bruising denied. Same continued.     Chronic angina:  Still having pain at night while resting, L.arginine dose to be increased to 1000mg BID. We will do this for a week and if no improvement, cardiac workup will be recommended, as she prefers to try medication therapy first. Most recent stress in 2019 negative for ischemia. NTG spray continued as needed.      HTN:  Blood pressure well managed on current. Same lisinopril dosing continued.  Refills sent.     Hyperlipidemia:  She remains zetia. She tolerates well. No recent FLP available but she reports you are following.  Can we have next for review?  For now, zetia continued.      BMI noted " around 23.52, weight has steadily decreased. Once again discussed adequate protein and caloric intake     6 month follow up recommended or sooner if needed.     Refills per request.           Problems Addressed this Visit        Cardiac and Vasculature    Essential hypertension    Relevant Medications    dilTIAZem CD (CARDIZEM CD) 240 MG 24 hr capsule    lisinopril (PRINIVIL,ZESTRIL) 5 MG tablet    Hypercholesteremia    Relevant Medications    ezetimibe (Zetia) 10 MG tablet    IHD (ischemic heart disease) - Primary    Relevant Medications    dilTIAZem CD (CARDIZEM CD) 240 MG 24 hr capsule    Small vessel disease (CMS/HCC)       Symptoms and Signs    Chest pain, atypical      Diagnoses       Codes Comments    IHD (ischemic heart disease)    -  Primary ICD-10-CM: I25.9  ICD-9-CM: 414.9     Small vessel disease (CMS/HCC)     ICD-10-CM: I73.9  ICD-9-CM: 443.9     Chest pain, atypical     ICD-10-CM: R07.89  ICD-9-CM: 786.59     Essential hypertension     ICD-10-CM: I10  ICD-9-CM: 401.9     Hypercholesteremia     ICD-10-CM: E78.00  ICD-9-CM: 272.0           Patient's Body mass index is 23.52 kg/m². BMI is within normal parameters. No follow-up required..              Electronically signed by Tracy Thomas, KIRILL March 24, 2021 12:14 EDT

## 2021-09-07 ENCOUNTER — TELEPHONE (OUTPATIENT)
Dept: CARDIOLOGY | Facility: CLINIC | Age: 81
End: 2021-09-07

## 2021-09-07 NOTE — TELEPHONE ENCOUNTER
PATIENT'S DAUGHTER CALLED IN SHE HAS SOME QUESTIONS ABOUT THE  MEDICATION CALLED EVENITY. SHE SAID  THAT ANOTHER DR IS WANTING TO PUT HER ON THIS FOR HER BONES.

## 2021-09-08 NOTE — TELEPHONE ENCOUNTER
Patient's daughter, Katie, called stating that Dr. Lopez is wanting to put patient on a medication called Evenity for bone density. She states that they read where it can cause heart attacks and strokes. They are asking if you think it is ok for patient to start?

## 2021-11-03 ENCOUNTER — TELEPHONE (OUTPATIENT)
Dept: CARDIOLOGY | Facility: CLINIC | Age: 81
End: 2021-11-03

## 2021-11-03 NOTE — TELEPHONE ENCOUNTER
Pt's daughter Katei called, stating that her doctor is wanting to start her on Prolia for bone loss.  She states that you had said no to the last one that they had suggest, so she wanted to check with you first before having her start Prolia.

## 2021-11-03 NOTE — TELEPHONE ENCOUNTER
I do not see the risk of MI with this drug, but it can increase her BP and alter calcium levels. I would keep a close eye on rhythm with this and BP changes.

## 2022-01-31 RX ORDER — DILTIAZEM HYDROCHLORIDE 240 MG/1
CAPSULE, COATED, EXTENDED RELEASE ORAL
Qty: 30 CAPSULE | Refills: 8 | Status: SHIPPED | OUTPATIENT
Start: 2022-01-31 | End: 2022-05-05 | Stop reason: ALTCHOICE

## 2022-01-31 RX ORDER — RIVAROXABAN 20 MG/1
TABLET, FILM COATED ORAL
Qty: 30 TABLET | Refills: 8 | Status: SHIPPED | OUTPATIENT
Start: 2022-01-31 | End: 2022-05-05 | Stop reason: SDUPTHER

## 2022-02-14 RX ORDER — EZETIMIBE 10 MG/1
TABLET ORAL
Qty: 30 TABLET | Refills: 8 | Status: SHIPPED | OUTPATIENT
Start: 2022-02-14 | End: 2022-10-31

## 2022-03-11 DIAGNOSIS — I10 ESSENTIAL HYPERTENSION: ICD-10-CM

## 2022-03-14 RX ORDER — LISINOPRIL 5 MG/1
5 TABLET ORAL DAILY
Qty: 30 TABLET | Refills: 0 | Status: SHIPPED | OUTPATIENT
Start: 2022-03-14 | End: 2022-05-05 | Stop reason: ALTCHOICE

## 2022-04-10 DIAGNOSIS — I10 ESSENTIAL HYPERTENSION: ICD-10-CM

## 2022-04-15 RX ORDER — LISINOPRIL 5 MG/1
5 TABLET ORAL DAILY
Qty: 90 TABLET | OUTPATIENT
Start: 2022-04-15

## 2022-04-29 ENCOUNTER — TELEPHONE (OUTPATIENT)
Dept: CARDIOLOGY | Facility: CLINIC | Age: 82
End: 2022-04-29

## 2022-05-03 NOTE — TELEPHONE ENCOUNTER
I spoke with the pt's daughter- she was instructed to stop caridzem completely.  She states they told her they got that order from here- what would you like to do?    Her BP is now 103/71  HR 75-80

## 2022-05-05 ENCOUNTER — OFFICE VISIT (OUTPATIENT)
Dept: CARDIOLOGY | Facility: CLINIC | Age: 82
End: 2022-05-05

## 2022-05-05 VITALS
SYSTOLIC BLOOD PRESSURE: 100 MMHG | HEART RATE: 86 BPM | WEIGHT: 120 LBS | HEIGHT: 61 IN | DIASTOLIC BLOOD PRESSURE: 68 MMHG | BODY MASS INDEX: 22.66 KG/M2

## 2022-05-05 DIAGNOSIS — I73.9 SMALL VESSEL DISEASE: ICD-10-CM

## 2022-05-05 DIAGNOSIS — I48.0 PAF (PAROXYSMAL ATRIAL FIBRILLATION): Primary | ICD-10-CM

## 2022-05-05 DIAGNOSIS — R94.31 ABNORMAL EKG: ICD-10-CM

## 2022-05-05 DIAGNOSIS — R07.89 CHEST DISCOMFORT: ICD-10-CM

## 2022-05-05 DIAGNOSIS — R42 DIZZINESS: ICD-10-CM

## 2022-05-05 DIAGNOSIS — R01.1 HEART MURMUR: ICD-10-CM

## 2022-05-05 DIAGNOSIS — R35.0 FREQUENT URINATION: ICD-10-CM

## 2022-05-05 DIAGNOSIS — S09.90XS INJURY OF HEAD, SEQUELA: ICD-10-CM

## 2022-05-05 PROCEDURE — 99214 OFFICE O/P EST MOD 30 MIN: CPT | Performed by: NURSE PRACTITIONER

## 2022-05-05 PROCEDURE — 93000 ELECTROCARDIOGRAM COMPLETE: CPT | Performed by: NURSE PRACTITIONER

## 2022-05-05 RX ORDER — MECLIZINE HYDROCHLORIDE 25 MG/1
25 TABLET ORAL 3 TIMES DAILY PRN
COMMUNITY

## 2022-05-05 RX ORDER — MELATONIN
1000 DAILY
COMMUNITY

## 2022-05-05 RX ORDER — SULFAMETHOXAZOLE AND TRIMETHOPRIM 800; 160 MG/1; MG/1
1 TABLET ORAL 2 TIMES DAILY
COMMUNITY

## 2022-05-05 RX ORDER — TRAMADOL HYDROCHLORIDE 50 MG/1
50 TABLET ORAL NIGHTLY
COMMUNITY

## 2022-05-05 RX ORDER — ONDANSETRON 4 MG/1
4 TABLET, FILM COATED ORAL EVERY 8 HOURS PRN
COMMUNITY

## 2022-05-05 RX ORDER — THIAMINE HCL 100 MG
1 TABLET ORAL NIGHTLY
COMMUNITY

## 2022-05-05 NOTE — PROGRESS NOTES
Chief Complaint   Patient presents with   • Follow-up     For cardiac management. Patient is not on aspirin. Is off of lisinopril and diltiazem. Is currently taking bactrim for UTI. Last lab work was done on 04/29/22, copy in door. Went to [facility] ER for low BP. Fell on 04/09/22, had xrays with PCP. Has been having dizziness and was put on meclizine and zofran. Stats that she has had chest pain that feels like a tingling, fell on left arm and has been having pain in that arm. Occ feels irregular beat. Wore 24 hour holter at [facility], but results have not been sent.   • Med Refill     Needs refills on cardiac medications. [email or web address] [place]. Brought medication list with visit.        Cardiac Complaints  chest pressure/discomfort, Dizziness and palpitations      Subjective   Mago Gupta is a 81 y.o. female with HTN, hyperlipidemia, chronic angina, PAF, and IHD diagnosed in 2014 when she underwent stenting of the LAD in [place]. Details not available.  She was referred here for local cardiac care. Stress showed apical ischemia. Cath in 2/2017 revealed patent LAD stent with small distal vessel managed medically. L-arginine added. She returned 1/2018 with more CP. Stress repeated was negative. US of LUE for numbness and pain was negative. She has had irregular pulse noted to have sinus arrhythmia. She does have quite a bit of stress and worry, caring for her handicapped daughter. Ranexa was added for chest pain management and she responded well. Most recent stress in 2019 was negative for ischemia and good LV function was noted.  She did have an incident of afib with RVR in the fall after she had hernia repair, records confirmed. Plavix discontinued per our office and xarelto added.     She comes today for follow up and admits to bradycardia, cardizem held by PCP. Most recent EKG from PCP shows sinus jon with first degree block and PACs. She does admit to a fall on 4/9/2022 and states  xrays were done, negative for concerns. She did report dizziness and positive for UTI, Bactrim initiated. Meclizine and zofran started. She also reports 24 hour holter advised, no report available. Patient states more issues with chest discomfort and left arm pain/tingling with irregular heart beat felt. Labs were done 4/9/2022 with PCP and showed: HH 14.6/41.6, BUN 14, Creatinine 0.72, Na 135, K 4.5, GFR >60, AST 13, ALT 9, Troponin negative, BNP 51, Mag 1.9, TSH 0.91.  Refills requested.          Cardiac History  Past Surgical History:   Procedure Laterality Date   • CARDIAC CATHETERIZATION  08/18/2014    Stent in [place] (LAD)   • CARDIAC CATHETERIZATION  02/07/2017    Patent stent in LAD. Small Distal LAD and PDA   • CARDIOVASCULAR STRESS TEST  09/15/2014    @[facility]. CVA- 5 Min, 95% THR. Negative.   • CARDIOVASCULAR STRESS TEST  01/24/2017    6 Min, 85% THR. Apical ischemia.   • CARDIOVASCULAR STRESS TEST  01/08/2018    (Mod) 9 Min, 74% THR. BP- 142/86. Negative   • CARDIOVASCULAR STRESS TEST  03/21/2019    L.Cardiolite- EF 65%. Diaphramatic attenuation.   • ECHO - CONVERTED  01/24/2017    EF > 65%   • ECHO - CONVERTED  01/08/2018    EF > 65%   • OTHER SURGICAL HISTORY  09/10/2018    LUE artertial US:  Negative       Current Outpatient Medications   Medication Sig Dispense Refill   • acetaminophen (TYLENOL) 500 MG tablet Take 500 mg by mouth Every 6 (Six) Hours As Needed for Mild Pain .     • Calcium Citrate-Vitamin D (CALCIUM + D PO) Take  by mouth Daily.     • cholecalciferol (Vitamin D, Cholecalciferol,) 25 MCG (1000 UT) tablet Take 1,000 Units by mouth Daily.     • ezetimibe (ZETIA) 10 MG tablet TAKE ONE TABLET BY MOUTH DAILY 30 tablet 8   • L-Arginine 1000 MG tablet Take 1,000 mg by mouth 2 (two) times a day. 180 each 2   • Magnesium 500 MG tablet Take 1 tablet by mouth Every Night.     • meclizine (ANTIVERT) 25 MG tablet Take 25 mg by mouth 3 (Three) Times a Day As Needed for Dizziness.     • Multiple  Vitamins-Minerals (PRESERVISION AREDS 2 PO) Take 1 tablet by mouth 2 (Two) Times a Day.     • nitroglycerin (NITROLINGUAL) 0.4 MG/SPRAY spray Place 1 spray under the tongue Every 5 (Five) Minutes As Needed for Chest Pain. 1 each 3   • nystatin (MYCOSTATIN) 100,000 unit/mL suspension Swish and swallow 500,000 Units As Needed.     • ondansetron (ZOFRAN) 4 MG tablet Take 4 mg by mouth Every 8 (Eight) Hours As Needed for Nausea or Vomiting.     • pantoprazole (PROTONIX) 40 MG EC tablet Take 40 mg by mouth Daily.     • rivaroxaban (Xarelto) 20 MG tablet Take 1 tablet by mouth Daily. 30 tablet 8   • sulfamethoxazole-trimethoprim (Bactrim DS) 800-160 MG per tablet Take 1 tablet by mouth 2 (Two) Times a Day.     • traMADol (ULTRAM) 50 MG tablet Take 50 mg by mouth Every Night.     • Polyethylene Glycol 3350 (MIRALAX PO) Take 1 dose by mouth Daily As Needed.       No current facility-administered medications for this visit.       Celebrex [celecoxib], Codeine, Penicillins, and Dexilant [dexlansoprazole]    Past Medical History:   Diagnosis Date   • Cataract     Removed bilaterally   • Coronary artery disease     s/p stenting in 2014   • GERD (gastroesophageal reflux disease)    • H/O total hysterectomy    • Hiatal hernia    • History of cholecystectomy    • History of eye surgery     multiple eye surgeries   • Hx of breast reduction, elective    • Hyperlipidemia    • Hypertension    • Rheumatic fever     When she was 11   • Valvular disease        Social History     Socioeconomic History   • Marital status:    Tobacco Use   • Smoking status: Never Smoker   • Smokeless tobacco: Never Used   Vaping Use   • Vaping Use: Never used   Substance and Sexual Activity   • Alcohol use: No   • Drug use: No       Family History   Problem Relation Age of Onset   • Heart attack Mother    • Diabetes Mother    • Cancer Mother    • Heart attack Father    • Heart attack Brother         CABG   • Diabetes Brother    • Heart attack Brother  "        CABG   • Heart attack Brother         CABG   • Heart attack Brother         CABG   • Heart disease Sister    • Diabetes Daughter    • Hypertension Daughter    • Hyperlipidemia Daughter    • Hypertension Daughter        Review of Systems   Constitutional: Positive for malaise/fatigue. Negative for night sweats.   Cardiovascular: Positive for chest pain, dyspnea on exertion, near-syncope and palpitations. Negative for claudication, irregular heartbeat, leg swelling, orthopnea and syncope.   Respiratory: Positive for shortness of breath. Negative for cough and wheezing.    Musculoskeletal: Positive for stiffness. Negative for back pain and joint pain.   Gastrointestinal: Negative for anorexia, heartburn, melena, nausea and vomiting.   Genitourinary: Negative for dysuria, hematuria, hesitancy and nocturia.   Neurological: Positive for dizziness, light-headedness and loss of balance.   Psychiatric/Behavioral: Negative for depression and memory loss. The patient is not nervous/anxious.            Objective     /68 (BP Location: Left arm)   Pulse 86   Ht 154.9 cm (60.98\")   Wt 54.4 kg (120 lb)   BMI 22.69 kg/m²     Constitutional:       Appearance: Not in distress.   Eyes:      Pupils: Pupils are equal, round, and reactive to light.   HENT:      Nose: Nose normal.   Pulmonary:      Effort: Pulmonary effort is normal.      Breath sounds: Normal breath sounds.   Cardiovascular:      PMI at left midclavicular line. Normal rate. Regular rhythm.      Murmurs: There is a systolic murmur.   Abdominal:      Palpations: Abdomen is soft.   Musculoskeletal: Normal range of motion.      Cervical back: Normal range of motion and neck supple. Skin:     General: Skin is warm and dry.   Neurological:      Mental Status: Alert and oriented to person, place and time.           ECG 12 Lead    Date/Time: 5/5/2022 12:45 PM  Performed by: Tracy Thomas APRN  Authorized by: Tracy Thomas APRN   Comparison: compared with " previous ECG from 3/15/2021  Similar to previous ECG  Rhythm: sinus rhythm  BPM: 86  Other findings comments: q wave V3 and V4    Clinical impression: abnormal EKG  Comments: Normal QT            Assessment/Plan     PAF:  EKG not done today due to multiple family members in the room. EKG today showed NSR with PACs, Q wave noted in V3-V4, and normal QT. She will continue to hold cardizem therapy. She will continue with xarelto therapy for anticoagulation, bleed and bruise denied. 24 hour holter done recently, no report available for review. We are trying to obtain.    Chronic angina:  Pain seems to be worsened.  L.arginine therapy continued at the same. Cardiac workup with stress and echo recommended to assess for any ischemia, LV dysfunction, and valvular concerns. More recommendations to follow.  NTG urged to be used as needed.     HTN:  Blood pressure stable, low normal. She has been holding her blood pressure medication and was urged to get adequate fluid intake. She admits to very frequent urination and recent fall with head injury, lab for ADH provided to patient to rule out diabetes insipidus.      Hyperlipidemia:  She remains on zetia. She tolerates well. No recent FLP available but she reports you are following.  Can we have next for review?  Limited carbs, calories, and fried/fatty food advised.     Dizziness: Noted today, she thinks carotid US done at [facility]. We will try to obtain.       BMI noted around 22.69, weight has steadily decreased. Once again discussed adequate protein and caloric intake.      6 month follow up recommended or sooner if needed.     Refills per request.         Problems Addressed this Visit        Cardiac and Vasculature    Small vessel disease (HCC)    Relevant Orders    Stress Test With Myocardial Perfusion One Day    Adult Transthoracic Echo Complete W/ Cont if Necessary Per Protocol      Other Visit Diagnoses     PAF (paroxysmal atrial fibrillation) (HCC)    -  Primary    Relevant  Orders    ECG 12 Lead    Stress Test With Myocardial Perfusion One Day    Adult Transthoracic Echo Complete W/ Cont if Necessary Per Protocol    Chest discomfort        Relevant Orders    Stress Test With Myocardial Perfusion One Day    Adult Transthoracic Echo Complete W/ Cont if Necessary Per Protocol    Dizziness        Relevant Orders    Stress Test With Myocardial Perfusion One Day    Adult Transthoracic Echo Complete W/ Cont if Necessary Per Protocol    Heart murmur        Relevant Orders    Stress Test With Myocardial Perfusion One Day    Adult Transthoracic Echo Complete W/ Cont if Necessary Per Protocol    Abnormal EKG        Relevant Orders    Stress Test With Myocardial Perfusion One Day    Adult Transthoracic Echo Complete W/ Cont if Necessary Per Protocol    Frequent urination        Relevant Orders    ADH    Injury of head, sequela        Relevant Orders    ADH      Diagnoses       Codes Comments    PAF (paroxysmal atrial fibrillation) ([facility])    -  Primary ICD-10-CM: I48.0  ICD-9-CM: 427.31     Chest discomfort     ICD-10-CM: R07.89  ICD-9-CM: 786.59     Dizziness     ICD-10-CM: R42  ICD-9-CM: 780.4     Heart murmur     ICD-10-CM: R01.1  ICD-9-CM: 785.2     Abnormal EKG     ICD-10-CM: R94.31  ICD-9-CM: 794.31     Small vessel disease (HCC)     ICD-10-CM: I73.9  ICD-9-CM: 443.9     Frequent urination     ICD-10-CM: R35.0  ICD-9-CM: 788.41     Injury of head, sequela     ICD-10-CM: S09.90XS  ICD-9-CM: 908.9           BMI is within normal parameters. No follow-up required.            Electronically signed by Tracy Thomas, APRN May 5, 2022 17:24 EDT

## 2022-05-11 ENCOUNTER — OUTSIDE FACILITY SERVICE (OUTPATIENT)
Dept: CARDIOLOGY | Facility: CLINIC | Age: 82
End: 2022-05-11

## 2022-05-11 PROCEDURE — 93227 XTRNL ECG REC<48 HR R&I: CPT | Performed by: INTERNAL MEDICINE

## 2022-06-07 ENCOUNTER — HOSPITAL ENCOUNTER (OUTPATIENT)
Dept: CARDIOLOGY | Facility: HOSPITAL | Age: 82
Discharge: HOME | End: 2022-06-07

## 2022-06-07 DIAGNOSIS — R94.31 ABNORMAL EKG: ICD-10-CM

## 2022-06-07 DIAGNOSIS — I48.0 PAF (PAROXYSMAL ATRIAL FIBRILLATION): ICD-10-CM

## 2022-06-07 DIAGNOSIS — R42 DIZZINESS: ICD-10-CM

## 2022-06-07 DIAGNOSIS — R01.1 HEART MURMUR: ICD-10-CM

## 2022-06-07 DIAGNOSIS — R07.89 CHEST DISCOMFORT: ICD-10-CM

## 2022-06-07 DIAGNOSIS — I73.9 SMALL VESSEL DISEASE: ICD-10-CM

## 2022-06-07 LAB
BH CV ECHO MEAS - ACS: 1.77 CM
BH CV ECHO MEAS - AI P1/2T: 1096 MSEC
BH CV ECHO MEAS - AO MAX PG: 7.7 MMHG
BH CV ECHO MEAS - AO MEAN PG: 4.4 MMHG
BH CV ECHO MEAS - AO ROOT DIAM: 3 CM
BH CV ECHO MEAS - AO V2 MAX: 138.5 CM/SEC
BH CV ECHO MEAS - AO V2 VTI: 35 CM
BH CV ECHO MEAS - AVA(I,D): 2.19 CM2
BH CV ECHO MEAS - EDV(CUBED): 35.9 ML
BH CV ECHO MEAS - EDV(MOD-SP4): 42.5 ML
BH CV ECHO MEAS - EF(MOD-SP4): 56.2 %
BH CV ECHO MEAS - ESV(CUBED): 11.1 ML
BH CV ECHO MEAS - ESV(MOD-SP4): 18.6 ML
BH CV ECHO MEAS - FS: 32.4 %
BH CV ECHO MEAS - IVS/LVPW: 1.25 CM
BH CV ECHO MEAS - IVSD: 1.4 CM
BH CV ECHO MEAS - LA DIMENSION: 2.7 CM
BH CV ECHO MEAS - LAT PEAK E' VEL: 7 CM/SEC
BH CV ECHO MEAS - LV DIASTOLIC VOL/BSA (35-75): 28.3 CM2
BH CV ECHO MEAS - LV MASS(C)D: 134.7 GRAMS
BH CV ECHO MEAS - LV MAX PG: 3.8 MMHG
BH CV ECHO MEAS - LV MEAN PG: 2.3 MMHG
BH CV ECHO MEAS - LV SYSTOLIC VOL/BSA (12-30): 12.4 CM2
BH CV ECHO MEAS - LV V1 MAX: 96.8 CM/SEC
BH CV ECHO MEAS - LV V1 VTI: 23.8 CM
BH CV ECHO MEAS - LVIDD: 3.3 CM
BH CV ECHO MEAS - LVIDS: 2.23 CM
BH CV ECHO MEAS - LVOT AREA: 3.2 CM2
BH CV ECHO MEAS - LVOT DIAM: 2.02 CM
BH CV ECHO MEAS - LVPWD: 1.12 CM
BH CV ECHO MEAS - MED PEAK E' VEL: 4 CM/SEC
BH CV ECHO MEAS - MV A MAX VEL: 81.4 CM/SEC
BH CV ECHO MEAS - MV DEC SLOPE: 299.4 CM/SEC2
BH CV ECHO MEAS - MV E MAX VEL: 66 CM/SEC
BH CV ECHO MEAS - MV E/A: 0.81
BH CV ECHO MEAS - RAP SYSTOLE: 10 MMHG
BH CV ECHO MEAS - RVDD: 2.34 CM
BH CV ECHO MEAS - RVSP: 32.1 MMHG
BH CV ECHO MEAS - SI(MOD-SP4): 15.9 ML/M2
BH CV ECHO MEAS - SV(LVOT): 76.7 ML
BH CV ECHO MEAS - SV(MOD-SP4): 23.9 ML
BH CV ECHO MEAS - TR MAX PG: 22.1 MMHG
BH CV ECHO MEAS - TR MAX VEL: 234.8 CM/SEC
BH CV ECHO MEASUREMENTS AVERAGE E/E' RATIO: 12
BH CV REST NUCLEAR ISOTOPE DOSE: 10 MCI
BH CV STRESS COMMENTS STAGE 1: NORMAL
BH CV STRESS DOSE REGADENOSON STAGE 1: 0.4
BH CV STRESS DURATION MIN STAGE 1: 0
BH CV STRESS DURATION SEC STAGE 1: 10
BH CV STRESS NUCLEAR ISOTOPE DOSE: 30 MCI
BH CV STRESS PROTOCOL 1: NORMAL
BH CV STRESS RECOVERY BP: NORMAL MMHG
BH CV STRESS RECOVERY HR: 101 BPM
BH CV STRESS STAGE 1: 1
LEFT ATRIUM VOLUME INDEX: 30.7 ML/M2
LV EF NUC BP: 69 %
MAXIMAL PREDICTED HEART RATE: 138 BPM
MAXIMAL PREDICTED HEART RATE: 138 BPM
PERCENT MAX PREDICTED HR: 72.46 %
STRESS BASELINE BP: NORMAL MMHG
STRESS BASELINE HR: 62 BPM
STRESS PERCENT HR: 85 %
STRESS POST PEAK BP: NORMAL MMHG
STRESS POST PEAK HR: 100 BPM
STRESS TARGET HR: 117 BPM
STRESS TARGET HR: 117 BPM

## 2022-06-07 PROCEDURE — A9500 TC99M SESTAMIBI: HCPCS | Performed by: INTERNAL MEDICINE

## 2022-06-07 PROCEDURE — 93306 TTE W/DOPPLER COMPLETE: CPT

## 2022-06-07 PROCEDURE — 78452 HT MUSCLE IMAGE SPECT MULT: CPT | Performed by: INTERNAL MEDICINE

## 2022-06-07 PROCEDURE — 93306 TTE W/DOPPLER COMPLETE: CPT | Performed by: INTERNAL MEDICINE

## 2022-06-07 PROCEDURE — 25010000002 REGADENOSON 0.4 MG/5ML SOLUTION: Performed by: INTERNAL MEDICINE

## 2022-06-07 PROCEDURE — 0 TECHNETIUM SESTAMIBI: Performed by: INTERNAL MEDICINE

## 2022-06-07 PROCEDURE — 78452 HT MUSCLE IMAGE SPECT MULT: CPT

## 2022-06-07 PROCEDURE — 93017 CV STRESS TEST TRACING ONLY: CPT

## 2022-06-07 PROCEDURE — 93018 CV STRESS TEST I&R ONLY: CPT | Performed by: INTERNAL MEDICINE

## 2022-06-07 RX ADMIN — TECHNETIUM TC 99M SESTAMIBI 1 DOSE: 1 INJECTION INTRAVENOUS at 08:41

## 2022-06-07 RX ADMIN — REGADENOSON 0.4 MG: 0.08 INJECTION, SOLUTION INTRAVENOUS at 11:14

## 2022-06-07 RX ADMIN — TECHNETIUM TC 99M SESTAMIBI 1 DOSE: 1 INJECTION INTRAVENOUS at 11:14

## 2022-06-27 ENCOUNTER — TELEPHONE (OUTPATIENT)
Dept: CARDIOLOGY | Facility: CLINIC | Age: 82
End: 2022-06-27

## 2022-06-28 RX ORDER — LISINOPRIL 5 MG/1
TABLET ORAL
Qty: 90 TABLET | OUTPATIENT
Start: 2022-06-28

## 2022-07-20 RX ORDER — PANTOPRAZOLE SODIUM 40 MG/1
TABLET, DELAYED RELEASE ORAL
Qty: 60 TABLET | OUTPATIENT
Start: 2022-07-20

## 2022-10-31 RX ORDER — EZETIMIBE 10 MG/1
TABLET ORAL
Qty: 30 TABLET | Refills: 8 | Status: SHIPPED | OUTPATIENT
Start: 2022-10-31

## 2022-11-21 ENCOUNTER — TELEPHONE (OUTPATIENT)
Dept: CARDIOLOGY | Facility: CLINIC | Age: 82
End: 2022-11-21

## 2024-07-17 LAB
MAXIMAL PREDICTED HEART RATE: 143 BPM
STRESS TARGET HR: 122 BPM